# Patient Record
Sex: MALE | Race: WHITE | Employment: FULL TIME | URBAN - NONMETROPOLITAN AREA
[De-identification: names, ages, dates, MRNs, and addresses within clinical notes are randomized per-mention and may not be internally consistent; named-entity substitution may affect disease eponyms.]

---

## 2022-07-23 ENCOUNTER — APPOINTMENT (OUTPATIENT)
Dept: GENERAL RADIOLOGY | Age: 52
DRG: 291 | End: 2022-07-23
Payer: COMMERCIAL

## 2022-07-23 ENCOUNTER — HOSPITAL ENCOUNTER (INPATIENT)
Age: 52
LOS: 3 days | Discharge: HOME | DRG: 291 | End: 2022-07-26
Attending: INTERNAL MEDICINE | Admitting: INTERNAL MEDICINE
Payer: COMMERCIAL

## 2022-07-23 ENCOUNTER — APPOINTMENT (OUTPATIENT)
Dept: CT IMAGING | Age: 52
DRG: 291 | End: 2022-07-23
Payer: COMMERCIAL

## 2022-07-23 ENCOUNTER — APPOINTMENT (OUTPATIENT)
Dept: ULTRASOUND IMAGING | Age: 52
DRG: 291 | End: 2022-07-23
Payer: COMMERCIAL

## 2022-07-23 DIAGNOSIS — I50.31 ACUTE DIASTOLIC CHF (CONGESTIVE HEART FAILURE) (HCC): ICD-10-CM

## 2022-07-23 DIAGNOSIS — R60.1 ANASARCA: ICD-10-CM

## 2022-07-23 DIAGNOSIS — N50.89 SCROTAL SWELLING: ICD-10-CM

## 2022-07-23 DIAGNOSIS — I50.23 ACUTE ON CHRONIC SYSTOLIC CONGESTIVE HEART FAILURE (HCC): Primary | ICD-10-CM

## 2022-07-23 PROBLEM — J96.01 ACUTE RESPIRATORY FAILURE WITH HYPOXIA (HCC): Status: ACTIVE | Noted: 2022-07-23

## 2022-07-23 PROBLEM — E66.01 MORBID OBESITY (HCC): Status: ACTIVE | Noted: 2022-07-23

## 2022-07-23 PROBLEM — R73.9 HYPERGLYCEMIA, UNSPECIFIED: Status: ACTIVE | Noted: 2022-07-23

## 2022-07-23 PROBLEM — N43.3 BILATERAL HYDROCELE: Status: ACTIVE | Noted: 2022-07-23

## 2022-07-23 PROBLEM — E87.3 ACUTE RESPIRATORY ALKALOSIS: Status: ACTIVE | Noted: 2022-07-23

## 2022-07-23 PROBLEM — R09.89 PULMONARY VASCULAR CONGESTION: Status: ACTIVE | Noted: 2022-07-23

## 2022-07-23 LAB
ALBUMIN SERPL-MCNC: 4.4 G/DL (ref 3.5–5.1)
ALP BLD-CCNC: 84 U/L (ref 38–126)
ALT SERPL-CCNC: 19 U/L (ref 11–66)
ANION GAP SERPL CALCULATED.3IONS-SCNC: 13 MEQ/L (ref 8–16)
AST SERPL-CCNC: 34 U/L (ref 5–40)
AVERAGE GLUCOSE: 114 MG/DL (ref 70–126)
BASE EXCESS MIXED: 4.2 MMOL/L (ref -2–3)
BASOPHILS # BLD: 0.3 %
BASOPHILS ABSOLUTE: 0 THOU/MM3 (ref 0–0.1)
BILIRUB SERPL-MCNC: 0.4 MG/DL (ref 0.3–1.2)
BILIRUBIN URINE: NEGATIVE
BLOOD, URINE: NEGATIVE
BUN BLDV-MCNC: 7 MG/DL (ref 7–22)
CALCIUM SERPL-MCNC: 8.9 MG/DL (ref 8.5–10.5)
CHARACTER, URINE: CLEAR
CHLORIDE BLD-SCNC: 94 MEQ/L (ref 98–111)
CO2: 29 MEQ/L (ref 23–33)
COLLECTED BY:: ABNORMAL
COLOR: YELLOW
CREAT SERPL-MCNC: 0.9 MG/DL (ref 0.4–1.2)
D-DIMER QUANTITATIVE: 1104 NG/ML FEU (ref 0–500)
EOSINOPHIL # BLD: 2.1 %
EOSINOPHILS ABSOLUTE: 0.1 THOU/MM3 (ref 0–0.4)
ERYTHROCYTE [DISTWIDTH] IN BLOOD BY AUTOMATED COUNT: 15.2 % (ref 11.5–14.5)
ERYTHROCYTE [DISTWIDTH] IN BLOOD BY AUTOMATED COUNT: 49.7 FL (ref 35–45)
GFR SERPL CREATININE-BSD FRML MDRD: 89 ML/MIN/1.73M2
GLUCOSE BLD-MCNC: 149 MG/DL (ref 70–108)
GLUCOSE URINE: NEGATIVE MG/DL
HBA1C MFR BLD: 5.8 % (ref 4.4–6.4)
HCO3, MIXED: 28 MMOL/L (ref 23–28)
HCT VFR BLD CALC: 49 % (ref 42–52)
HEMOGLOBIN: 15.2 GM/DL (ref 14–18)
IMMATURE GRANS (ABS): 0.01 THOU/MM3 (ref 0–0.07)
IMMATURE GRANULOCYTES: 0.2 %
KETONES, URINE: NEGATIVE
LEUKOCYTE ESTERASE, URINE: NEGATIVE
LYMPHOCYTES # BLD: 9.6 %
LYMPHOCYTES ABSOLUTE: 0.6 THOU/MM3 (ref 1–4.8)
MAGNESIUM: 2.2 MG/DL (ref 1.6–2.4)
MCH RBC QN AUTO: 27.8 PG (ref 26–33)
MCHC RBC AUTO-ENTMCNC: 31 GM/DL (ref 32.2–35.5)
MCV RBC AUTO: 89.6 FL (ref 80–94)
MONOCYTES # BLD: 7.4 %
MONOCYTES ABSOLUTE: 0.4 THOU/MM3 (ref 0.4–1.3)
NITRITE, URINE: NEGATIVE
NUCLEATED RED BLOOD CELLS: 0 /100 WBC
O2 SAT, MIXED: 100 %
OSMOLALITY CALCULATION: 272.7 MOSMOL/KG (ref 275–300)
PCO2, MIXED VENOUS: 39 MMHG (ref 41–51)
PH UA: 6 (ref 5–9)
PH, MIXED: 7.47 (ref 7.31–7.41)
PHOSPHORUS: 4 MG/DL (ref 2.4–4.7)
PLATELET # BLD: 188 THOU/MM3 (ref 130–400)
PMV BLD AUTO: 8.7 FL (ref 9.4–12.4)
PO2 MIXED: 193 MMHG (ref 25–40)
POTASSIUM REFLEX MAGNESIUM: 4.4 MEQ/L (ref 3.5–5.2)
PRO-BNP: 237.8 PG/ML (ref 0–900)
PROTEIN UA: NEGATIVE
RBC # BLD: 5.47 MILL/MM3 (ref 4.7–6.1)
SEG NEUTROPHILS: 80.4 %
SEGMENTED NEUTROPHILS ABSOLUTE COUNT: 4.7 THOU/MM3 (ref 1.8–7.7)
SODIUM BLD-SCNC: 136 MEQ/L (ref 135–145)
SPECIFIC GRAVITY, URINE: 1 (ref 1–1.03)
TOTAL PROTEIN: 7.7 G/DL (ref 6.1–8)
TROPONIN T: < 0.01 NG/ML
TSH SERPL DL<=0.05 MIU/L-ACNC: 1.55 UIU/ML (ref 0.4–4.2)
UROBILINOGEN, URINE: 0.2 EU/DL (ref 0–1)
WBC # BLD: 5.8 THOU/MM3 (ref 4.8–10.8)

## 2022-07-23 PROCEDURE — 83735 ASSAY OF MAGNESIUM: CPT

## 2022-07-23 PROCEDURE — 2500000003 HC RX 250 WO HCPCS: Performed by: INTERNAL MEDICINE

## 2022-07-23 PROCEDURE — 99285 EMERGENCY DEPT VISIT HI MDM: CPT

## 2022-07-23 PROCEDURE — 99223 1ST HOSP IP/OBS HIGH 75: CPT | Performed by: INTERNAL MEDICINE

## 2022-07-23 PROCEDURE — 80053 COMPREHEN METABOLIC PANEL: CPT

## 2022-07-23 PROCEDURE — 85025 COMPLETE CBC W/AUTO DIFF WBC: CPT

## 2022-07-23 PROCEDURE — 84484 ASSAY OF TROPONIN QUANT: CPT

## 2022-07-23 PROCEDURE — 81003 URINALYSIS AUTO W/O SCOPE: CPT

## 2022-07-23 PROCEDURE — 96374 THER/PROPH/DIAG INJ IV PUSH: CPT

## 2022-07-23 PROCEDURE — 83880 ASSAY OF NATRIURETIC PEPTIDE: CPT

## 2022-07-23 PROCEDURE — 6370000000 HC RX 637 (ALT 250 FOR IP): Performed by: INTERNAL MEDICINE

## 2022-07-23 PROCEDURE — 6360000002 HC RX W HCPCS: Performed by: INTERNAL MEDICINE

## 2022-07-23 PROCEDURE — 93005 ELECTROCARDIOGRAM TRACING: CPT | Performed by: INTERNAL MEDICINE

## 2022-07-23 PROCEDURE — 82803 BLOOD GASES ANY COMBINATION: CPT

## 2022-07-23 PROCEDURE — 76870 US EXAM SCROTUM: CPT

## 2022-07-23 PROCEDURE — 85379 FIBRIN DEGRADATION QUANT: CPT

## 2022-07-23 PROCEDURE — 36415 COLL VENOUS BLD VENIPUNCTURE: CPT

## 2022-07-23 PROCEDURE — 1200000003 HC TELEMETRY R&B

## 2022-07-23 PROCEDURE — 84100 ASSAY OF PHOSPHORUS: CPT

## 2022-07-23 PROCEDURE — 83036 HEMOGLOBIN GLYCOSYLATED A1C: CPT

## 2022-07-23 PROCEDURE — 2580000003 HC RX 258: Performed by: INTERNAL MEDICINE

## 2022-07-23 PROCEDURE — 71045 X-RAY EXAM CHEST 1 VIEW: CPT

## 2022-07-23 PROCEDURE — 84443 ASSAY THYROID STIM HORMONE: CPT

## 2022-07-23 RX ORDER — ONDANSETRON 4 MG/1
4 TABLET, ORALLY DISINTEGRATING ORAL EVERY 8 HOURS PRN
Status: DISCONTINUED | OUTPATIENT
Start: 2022-07-23 | End: 2022-07-26 | Stop reason: HOSPADM

## 2022-07-23 RX ORDER — FUROSEMIDE 10 MG/ML
40 INJECTION INTRAMUSCULAR; INTRAVENOUS ONCE
Status: COMPLETED | OUTPATIENT
Start: 2022-07-23 | End: 2022-07-23

## 2022-07-23 RX ORDER — BUMETANIDE 0.25 MG/ML
1 INJECTION, SOLUTION INTRAMUSCULAR; INTRAVENOUS 2 TIMES DAILY
Status: DISCONTINUED | OUTPATIENT
Start: 2022-07-23 | End: 2022-07-24

## 2022-07-23 RX ORDER — SODIUM CHLORIDE 9 MG/ML
INJECTION, SOLUTION INTRAVENOUS PRN
Status: DISCONTINUED | OUTPATIENT
Start: 2022-07-23 | End: 2022-07-26 | Stop reason: HOSPADM

## 2022-07-23 RX ORDER — ENOXAPARIN SODIUM 100 MG/ML
30 INJECTION SUBCUTANEOUS 2 TIMES DAILY
Status: DISCONTINUED | OUTPATIENT
Start: 2022-07-24 | End: 2022-07-24

## 2022-07-23 RX ORDER — POLYETHYLENE GLYCOL 3350 17 G/17G
17 POWDER, FOR SOLUTION ORAL DAILY PRN
Status: DISCONTINUED | OUTPATIENT
Start: 2022-07-23 | End: 2022-07-26 | Stop reason: HOSPADM

## 2022-07-23 RX ORDER — LOSARTAN POTASSIUM 25 MG/1
25 TABLET ORAL DAILY
Status: DISCONTINUED | OUTPATIENT
Start: 2022-07-24 | End: 2022-07-26 | Stop reason: HOSPADM

## 2022-07-23 RX ORDER — ONDANSETRON 2 MG/ML
4 INJECTION INTRAMUSCULAR; INTRAVENOUS EVERY 6 HOURS PRN
Status: DISCONTINUED | OUTPATIENT
Start: 2022-07-23 | End: 2022-07-26 | Stop reason: HOSPADM

## 2022-07-23 RX ORDER — METOLAZONE 5 MG/1
5 TABLET ORAL DAILY
Status: COMPLETED | OUTPATIENT
Start: 2022-07-23 | End: 2022-07-24

## 2022-07-23 RX ORDER — POTASSIUM CHLORIDE 20 MEQ/1
20 TABLET, EXTENDED RELEASE ORAL 2 TIMES DAILY WITH MEALS
Status: DISCONTINUED | OUTPATIENT
Start: 2022-07-23 | End: 2022-07-26 | Stop reason: HOSPADM

## 2022-07-23 RX ORDER — ACETAMINOPHEN 325 MG/1
650 TABLET ORAL EVERY 6 HOURS PRN
Status: DISCONTINUED | OUTPATIENT
Start: 2022-07-23 | End: 2022-07-26 | Stop reason: HOSPADM

## 2022-07-23 RX ORDER — HYDRALAZINE HYDROCHLORIDE 20 MG/ML
10 INJECTION INTRAMUSCULAR; INTRAVENOUS EVERY 6 HOURS PRN
Status: DISCONTINUED | OUTPATIENT
Start: 2022-07-23 | End: 2022-07-26 | Stop reason: HOSPADM

## 2022-07-23 RX ORDER — SODIUM CHLORIDE 0.9 % (FLUSH) 0.9 %
10 SYRINGE (ML) INJECTION PRN
Status: DISCONTINUED | OUTPATIENT
Start: 2022-07-23 | End: 2022-07-26 | Stop reason: HOSPADM

## 2022-07-23 RX ORDER — SODIUM CHLORIDE 0.9 % (FLUSH) 0.9 %
10 SYRINGE (ML) INJECTION EVERY 12 HOURS SCHEDULED
Status: DISCONTINUED | OUTPATIENT
Start: 2022-07-23 | End: 2022-07-26 | Stop reason: HOSPADM

## 2022-07-23 RX ORDER — ACETAMINOPHEN 650 MG/1
650 SUPPOSITORY RECTAL EVERY 6 HOURS PRN
Status: DISCONTINUED | OUTPATIENT
Start: 2022-07-23 | End: 2022-07-26 | Stop reason: HOSPADM

## 2022-07-23 RX ADMIN — BUMETANIDE 1 MG: 0.25 INJECTION INTRAMUSCULAR; INTRAVENOUS at 23:25

## 2022-07-23 RX ADMIN — SODIUM CHLORIDE, PRESERVATIVE FREE 10 ML: 5 INJECTION INTRAVENOUS at 23:06

## 2022-07-23 RX ADMIN — POTASSIUM CHLORIDE 20 MEQ: 1500 TABLET, EXTENDED RELEASE ORAL at 23:26

## 2022-07-23 RX ADMIN — FUROSEMIDE 40 MG: 10 INJECTION, SOLUTION INTRAMUSCULAR; INTRAVENOUS at 16:21

## 2022-07-23 ASSESSMENT — ENCOUNTER SYMPTOMS
GASTROINTESTINAL NEGATIVE: 1
EYES NEGATIVE: 1
ALLERGIC/IMMUNOLOGIC NEGATIVE: 1
SHORTNESS OF BREATH: 1

## 2022-07-23 ASSESSMENT — PAIN - FUNCTIONAL ASSESSMENT
PAIN_FUNCTIONAL_ASSESSMENT: NONE - DENIES PAIN
PAIN_FUNCTIONAL_ASSESSMENT: WONG-BAKER FACES

## 2022-07-23 ASSESSMENT — LIFESTYLE VARIABLES
HOW OFTEN DO YOU HAVE A DRINK CONTAINING ALCOHOL: 4 OR MORE TIMES A WEEK
HOW MANY STANDARD DRINKS CONTAINING ALCOHOL DO YOU HAVE ON A TYPICAL DAY: 7 TO 9

## 2022-07-23 ASSESSMENT — PAIN SCALES - GENERAL: PAINLEVEL_OUTOF10: 5

## 2022-07-23 ASSESSMENT — PAIN SCALES - WONG BAKER: WONGBAKER_NUMERICALRESPONSE: 2

## 2022-07-23 ASSESSMENT — PAIN DESCRIPTION - LOCATION: LOCATION: GENERALIZED

## 2022-07-23 NOTE — ED PROVIDER NOTES
eMERGENCY dEPARTMENT eNCOUnter      [address]    Chief Complaint   Patient presents with    Testicle Swelling    Shortness of Breath       HPI    Ashvin Maradiaga is a 46 y.o. male who presents the emergency department because of scrotal swelling. Patient has the swelling for last 5 days. Patient is not taking his blood pressure medications and a diuretic for about a month now. Patient has been gaining weight since then. Patient does not have any chest pain or chest pressure but has been complaining of shortness of breath, dyspnea on exertion and orthopnea. Patient also has swelling in his lower extremity. Patient does not have any cough fever or chills there is no dysuria or frequency. There is no testicular pain    PAST MEDICAL HISTORY    History reviewed. No pertinent past medical history. SURGICAL HISTORY    History reviewed. No pertinent surgical history. CURRENT MEDICATIONS        ALLERGIES    Not on File    FAMILY HISTORY    History reviewed. No pertinent family history. SOCIAL HISTORY    Social History     Socioeconomic History    Marital status: Single     Spouse name: None    Number of children: None    Years of education: None    Highest education level: None       REVIEW OF SYSTEMS    Constitutional:  Denies fever, chills, weight loss or weakness   Eyes:  Denies photophobia or discharge   HENT:  Denies sore throat or ear pain   Respiratory:  Denies cough or shortness of breath   Cardiovascular:  Denies chest pain, palpitations or swelling   GI:  Denies abdominal pain, nausea, vomiting, or diarrhea   Musculoskeletal:  Denies back pain   Skin:  Denies rash   Neurologic:  Denies headache, focal weakness or sensory changes   Endocrine:  Denies polyuria or polydypsia   Lymphatic:  Denies swollen glands   Psychiatric:  Denies depression, suicidal ideation or homicidal ideation   All systems negative except as marked.      PHYSICAL EXAM    VITAL SIGNS: BP (!) 184/112   Pulse (!) 111   Temp 98.5 °F (36.9 °C) (Oral)   Resp 24   Wt (!) 320 lb (145.2 kg)   SpO2 95%    Constitutional:  Well developed, Well nourished, No acute distress, Non-toxic appearance. HENT:  Normocephalic, Atraumatic, Bilateral external ears normal, Oropharynx moist, No oral exudates, Nose normal. Neck- Normal range of motion, No tenderness, Supple, No stridor. Eyes:  PERRL, EOMI, Conjunctiva normal, No discharge. Respiratory:  Normal breath sounds, No respiratory distress, No wheezing, No chest tenderness. Cardiovascular:  Normal heart rate, Normal rhythm, No murmurs, No rubs, No gallops. GI:  Bowel sounds normal, Soft, No tenderness, No masses, No pulsatile masses. : Scrotal swelling which is gross, No masses or lesions. No discharge. No CVA tenderness. Musculoskeletal:  Intact distal pulses, No edema, No tenderness, No cyanosis, No clubbing. Good range of motion in all major joints. No tenderness to palpation or major deformities noted. Back- No tenderness. Integument:  Warm, Dry, No erythema, No rash. Lymphatic:  No lymphadenopathy noted. Neurologic:  Alert & oriented x 3, Normal motor function, Normal sensory function, No focal deficits noted. Psychiatric:  Affect normal, Judgment normal, Mood normal.     EKG    Sinus tachycardia  Left axis deviation  Pulmonary disease pattern  Right bundle lv block  Ventricular rate 114 bpm  AL interval 158 ms  QRS duration 112 ms  QTc interval 471 ms    RADIOLOGY    US SCROTUM AND TESTICLES   Final Result   1. Normal testicular ultrasound. 2. Bilateral hydroceles. 3. Scrotal swelling, likely secondary to cellulitis. Clinical correlation is required. 4. Normal Doppler study. Final report electronically signed by Dr. Luis Fernando Shaw on 7/23/2022 6:03 PM      XR CHEST PORTABLE   Final Result   1. Moderate cardiomegaly with pulmonary vascular congestion suspicious for congestive heart failure.    2. Prominent pulmonary interstitium suspicious for pulmonary edema. **This report has been created using voice recognition software. It may contain minor errors which are inherent in voice recognition technology. **      Final report electronically signed by Dr. Shaheed Hall on 7/23/2022 3:43 PM           LABS  Labs Reviewed   CBC WITH AUTO DIFFERENTIAL - Abnormal; Notable for the following components:       Result Value    MCHC 31.0 (*)     RDW-CV 15.2 (*)     RDW-SD 49.7 (*)     MPV 8.7 (*)     Lymphocytes Absolute 0.6 (*)     All other components within normal limits   COMPREHENSIVE METABOLIC PANEL W/ REFLEX TO MG FOR LOW K - Abnormal; Notable for the following components:    Glucose 149 (*)     Chloride 94 (*)     All other components within normal limits   GLOMERULAR FILTRATION RATE, ESTIMATED - Abnormal; Notable for the following components:    Est, Glom Filt Rate 89 (*)     All other components within normal limits   OSMOLALITY - Abnormal; Notable for the following components:    Osmolality Calc 272.7 (*)     All other components within normal limits   TROPONIN   BRAIN NATRIURETIC PEPTIDE   URINALYSIS WITH REFLEX TO CULTURE   ANION GAP   D-DIMER, QUANTITATIVE   MAGNESIUM   PHOSPHORUS   TSH WITH REFLEX   HEMOGLOBIN A1C   BLOOD GAS, VENOUS          CONSULTS  Hospitalist  ED COURSE & MEDICAL DECISION MAKING    Pertinent Labs & Imaging studies reviewed. (See chart for details)  Patient's ultrasound showed scrotal swelling without any testicular pathology. Patient chest x-ray was consistent with congestive heart failure versus pulmonary edema. Patient was started on IV diuretics which improved patient's condition. Patient white count is 5800 hemoglobin 15.2. Urine is negative. Patient will be admitted by hospitalist for further work-up and management. FINAL IMPRESSION    1. Acute on chronic systolic congestive heart failure ([address]    2. Anasarca    3.  Scrotal swelling             Gia Montemayor MD  07/23/22 2003

## 2022-07-23 NOTE — H&P
Patient: Saurabh Duenas    Unit/Bed: 6K-02/002-A    YOB: 1970    MRN: [id number]    Acct: [de-identified]     Admitting Diagnosis: Anasarca [R60.1]  Scrotal swelling [N50.89]  Acute on chronic systolic congestive heart failure (HCC) [A93.69]  Acute diastolic CHF (congestive heart failure) ([facility] [I50.31]    Admit Date:  7/23/2022    CC: Bilateral lower extremity swelling, scrotal swelling and shortness of breath x5 days. HPI :  70-year-old male patient who is morbidly obese. Presented to the ED today with a 5-day history of increased bilateral lower extremity swelling, scrotal   swelling without pain, shortness of breath at rest and on exertion . However , his symptoms have been going on for more than 2 weeks because he ran out of diuretics. He denies any chest pain, chest pressure, palpitations, nausea vomiting, and no diarrhea. Initial vital signs in the ED temperature 36.9 °C, respiratory 30, heart rate 116 bpm, blood pressure 180/110, oxygen saturation 89% on room air. Body weight is 145.2 kg. Initial labs in the ED serum sodium 136, potassium 4.4, chloride 94, CO2 29, BUN 7, creatinine 0.9, anion gap 13.0, blood sugar 149, calcium 8.9, serum osmolality 272.7, total protein 7.7, proBNP 237.8, troponin less than 0.010, albumin 4.4, alkaline phosphatase 84, ALT 19, AST 34, T bili 0.4. WBC 5.8, hemoglobin 15.2, MCV 89.6, platelet 572. Twelve-lead EKG which I reviewed shows sinus tachycardia at 114 bpm.  Right bundle branch block. QTC of 471 ms. No ST segment elevation or depression. Chest x-ray which I reviewed shows mild pulmonary congestion and haziness. No notable infiltrate. Moderate cardiomegaly. Review of Systems   Constitutional:  Positive for activity change. HENT: Negative. Eyes: Negative. Respiratory:  Positive for shortness of breath. Cardiovascular:  Positive for leg swelling. Gastrointestinal: Negative. Endocrine: Negative. Genitourinary:  Positive for scrotal swelling. Musculoskeletal: Negative. Skin: Negative. Allergic/Immunologic: Negative. Neurological: Negative. Hematological: Negative. Psychiatric/Behavioral: Negative. All other systems reviewed and are negative. Past Medical History:        Diagnosis Date    CHF (congestive heart failure) ([facility]     Hypertension        Past Surgical History:    History reviewed. No pertinent surgical history. Medications Prior to Admission:    Prior to Admission medications    Not on File       Allergies:    Patient has no allergy information on record. Social History:    TOBACCO:   has no history on file for tobacco use. ETOH:   has no history on file for alcohol use. Family History:    History reviewed. No pertinent family history. Objective:   BP (!) 163/104   Pulse (!) 110   Temp 98.1 °F (36.7 °C) (Oral)   Resp 24   Ht 5' 8\" (1.727 m)   Wt (!) 322 lb (146.1 kg)   SpO2 97%   BMI 48.96 kg/m²     Intake/Output Summary (Last 24 hours) at 7/24/2022 0231  Last data filed at 7/24/2022 0100  Gross per 24 hour   Intake --   Output 1350 ml   Net -1350 ml       Physical Exam  Vitals and nursing note reviewed. Constitutional:       General: He is not in acute distress. Appearance: Normal appearance. He is obese. He is not ill-appearing, toxic-appearing or diaphoretic. HENT:      Head: Normocephalic and atraumatic. Right Ear: External ear normal.      Left Ear: External ear normal.      Nose: Nose normal. No congestion or rhinorrhea. Mouth/Throat:      Mouth: Mucous membranes are moist.      Pharynx: Oropharynx is clear. No oropharyngeal exudate or posterior oropharyngeal erythema. Eyes:      General: No scleral icterus. Right eye: No discharge. Left eye: No discharge. Extraocular Movements: Extraocular movements intact.       Conjunctiva/sclera: Conjunctivae normal.      Pupils: Pupils are equal, round, and reactive to light.   Neck:      Vascular: No carotid bruit. Cardiovascular:      Rate and Rhythm: Normal rate and regular rhythm. Pulses: Normal pulses. Heart sounds: Normal heart sounds. No murmur heard. No friction rub. No gallop. Pulmonary:      Effort: Pulmonary effort is normal. No respiratory distress. Breath sounds: Normal breath sounds. No stridor. No wheezing, rhonchi or rales. Chest:      Chest wall: No tenderness. Abdominal:      General: Bowel sounds are normal. There is no distension. Palpations: Abdomen is soft. There is no mass. Tenderness: There is abdominal tenderness. There is no right CVA tenderness, left CVA tenderness, guarding or rebound. Hernia: No hernia is present. Comments: Nontender distended abdomen. Genitourinary:     Comments: Nontender scrotal swelling with edema  Musculoskeletal:         General: No swelling, tenderness, deformity or signs of injury. Normal range of motion. Cervical back: Normal range of motion and neck supple. No rigidity or tenderness. Right lower leg: Edema present. Left lower leg: Edema present. Lymphadenopathy:      Cervical: No cervical adenopathy. Skin:     General: Skin is warm. Coloration: Skin is not jaundiced or pale. Findings: No bruising, erythema, lesion or rash. Neurological:      General: No focal deficit present. Mental Status: He is alert and oriented to person, place, and time. Mental status is at baseline. Cranial Nerves: No cranial nerve deficit. Sensory: No sensory deficit. Motor: No weakness. Coordination: Coordination normal.      Gait: Gait normal.      Deep Tendon Reflexes: Reflexes normal.   Psychiatric:         Mood and Affect: Mood normal.         Behavior: Behavior normal.         Thought Content:  Thought content normal.         Judgment: Judgment normal.   :  Medications:   Continuous Infusions:    PRN Meds:    Data:    CBC:   Recent Labs 07/23/22  1520   WBC 5.8   RBC 5.47   HGB 15.2   HCT 49.0   MCV 89.6          BMP:   Recent Labs     07/23/22  1520 07/23/22 2006     --    K 4.4  --    CL 94*  --    CO2 29  --    PHOS  --  4.0   BUN 7  --    CREATININE 0.9  --        PT/INR: No results for input(s): PROTIME, INR in the last 72 hours. LIVER PROFILE:   Recent Labs     07/23/22  1520   AST 34   ALT 19   BILITOT 0.4   ALKPHOS 84       Latest Reference Range & Units 7/23/22 20:06   Magnesium 1.6 - 2.4 mg/dL 2.2   Phosphorus 2.4 - 4.7 mg/dL 4.0   AVERAGE GLUCOSE 70 - 126 mg/dL 114   Hemoglobin A1C 4.4 - 6.4 % 5.8   TSH 0.400 - 4.200 uIU/mL 1.550      Latest Reference Range & Units 7/23/22 20:06   D-Dimer, Quant 0.00 - 500.00 ng/ml FEU 1104.00 (H)     VBG. Latest Reference Range & Units 7/23/22 20:14   PCO2, MIXED VENOUS 41 - 51 mmhg 39 (L)   PO2, Mixed 25 - 40 mmhg 193 (H)   HCO3, Mixed 23 - 28 mmol/l 28   Base Exc, Mixed -2.0 - 3.0 mmol/l 4.2 (H)   O2 Sat, Mixed % 100   PH MIXED 7.31 - 7.41  7.47 (H)   COLLECTED BY:  [id number]       URINALYSIS. Latest Reference Range & Units 7/23/22 19:20   Color, UA STRAW-YELLOW  YELLOW   Glucose, UA NEGATIVE mg/dl NEGATIVE   Bilirubin, Urine NEGATIVE  NEGATIVE   Ketones, Urine NEGATIVE  NEGATIVE   Blood, Urine NEGATIVE  NEGATIVE   pH, UA 5.0 - 9.0  6.0   Protein, UA NEGATIVE  NEGATIVE   Urobilinogen, Urine 0.0 - 1.0 eu/dl 0.2   Nitrite, Urine NEGATIVE  NEGATIVE   Leukocyte Esterase, Urine NEGATIVE  NEGATIVE   Character, Urine CLEAR-SL CLOUD  CLEAR   URINALYSIS WITH REFLEX TO CULTURE  Rpt   Specific Gravity, Urine 1.002 - 1.030  1.005     SEROLOGY  None. TUMOR MARKERS. None. MICROBIOLOGY   None. HISTOPATHOLOGY. None. TOXICOLOGY. None. ENDOSCOPE STUDIES. None. SURGICAL PROCEDURES. None. CARDIAC PROCEDURES. None. IR PROCEDURES. None. RADIOLOGY. CTA chest -7/24/2022. FINDINGS:  The thyroid is unremarkable. Normal caliber of the thoracic aorta.      Borderline bilaterally. Doppler study:   There is color Doppler flow in both testicles and in the extratesticular structures. Flow is symmetric bilaterally. There is no Doppler evidence of torsion. IMPRESSION:  1. Normal testicular ultrasound. 2. Bilateral hydroceles. 3. Scrotal swelling, likely secondary to cellulitis. Clinical correlation is required. 4. Normal Doppler study. ASSESSMENT AND  PLAN. 1.PULMONARY. Acute respiratory alkalosis w/ Ph 7.47. Acute hypoxic respiratory failure due to vascular congestion - O2 supplement, wean as tolerated. REKHA / OHS - CPAP at bedtime . CTA negative for PE.     RVP check. 2.CARDIOVASCULAR. Acute CHF exacerbation w/ suspected diastolic dysfunction - IV Bumex and Zaroxolyn. Uncontrolled hypertension-IV hydralazine as needed, losartan 25 and Coreg. Echo for LV and WMA evaluation. 3.GI. Hepatomegaly with hepatic steatosis. 4.ID. UA negative for UTI. 5.ENDO. Hyperglycemia -sliding scale insulin. TSH 1.55 and A1c 5.8 .    6.UROLOGY. Bilateral hydroceles. 7.NUTRITION. Morbid obesity with BMI needs regular exercise diet control for weight loss management. 8.DISPO. Planned d/c to home soon.       Electronically signed by Ruby Hassan MD on 7/24/2022 at 2:31 AM

## 2022-07-23 NOTE — ED NOTES
Upon first contact with patient this RN receives bedside shift report from [facility]. Pt is stable resting in cot. Urine specimen collected at this time. Pt updated on POC, verbalized understanding. Call light in reach.         Ac Cox RN  07/23/22 0625

## 2022-07-23 NOTE — ED NOTES
Pt reassessed. Resting on cot in stable condition. Denies pain.  Call light in reach     Delmi Kay RN  07/23/22 2272

## 2022-07-24 ENCOUNTER — APPOINTMENT (OUTPATIENT)
Dept: CT IMAGING | Age: 52
DRG: 291 | End: 2022-07-24
Payer: COMMERCIAL

## 2022-07-24 ENCOUNTER — APPOINTMENT (OUTPATIENT)
Dept: INTERVENTIONAL RADIOLOGY/VASCULAR | Age: 52
DRG: 291 | End: 2022-07-24
Payer: COMMERCIAL

## 2022-07-24 PROBLEM — N43.2 OTHER HYDROCELE: Status: ACTIVE | Noted: 2022-07-23

## 2022-07-24 PROBLEM — N50.89 SCROTAL SWELLING: Status: ACTIVE | Noted: 2022-07-24

## 2022-07-24 PROBLEM — G47.33 OBSTRUCTIVE SLEEP APNEA: Status: ACTIVE | Noted: 2022-07-24

## 2022-07-24 PROBLEM — I87.2 CHRONIC VENOUS STASIS DERMATITIS: Status: ACTIVE | Noted: 2022-07-24

## 2022-07-24 PROBLEM — R16.0 HEPATOMEGALY: Status: ACTIVE | Noted: 2022-07-24

## 2022-07-24 PROBLEM — J81.0 ACUTE PULMONARY EDEMA (HCC): Status: ACTIVE | Noted: 2022-07-24

## 2022-07-24 PROBLEM — R60.1 ANASARCA: Status: ACTIVE | Noted: 2022-07-24

## 2022-07-24 LAB
ALBUMIN SERPL-MCNC: 3.8 G/DL (ref 3.5–5.1)
ALP BLD-CCNC: 78 U/L (ref 38–126)
ALT SERPL-CCNC: 16 U/L (ref 11–66)
APTT: 31.9 SECONDS (ref 22–38)
AST SERPL-CCNC: 26 U/L (ref 5–40)
BILIRUB SERPL-MCNC: 0.8 MG/DL (ref 0.3–1.2)
BILIRUBIN DIRECT: < 0.2 MG/DL (ref 0–0.3)
EKG ATRIAL RATE: 114 BPM
EKG P AXIS: 54 DEGREES
EKG P-R INTERVAL: 158 MS
EKG Q-T INTERVAL: 342 MS
EKG QRS DURATION: 112 MS
EKG QTC CALCULATION (BAZETT): 471 MS
EKG R AXIS: -86 DEGREES
EKG T AXIS: 60 DEGREES
EKG VENTRICULAR RATE: 114 BPM
GLUCOSE BLD-MCNC: 105 MG/DL (ref 70–108)
GLUCOSE BLD-MCNC: 108 MG/DL (ref 70–108)
GLUCOSE BLD-MCNC: 91 MG/DL (ref 70–108)
HEPARIN UNFRACTIONATED: 0.47 U/ML (ref 0.3–0.7)
INR BLD: 0.98 (ref 0.85–1.13)
INR BLD: 0.99 (ref 0.85–1.13)
LACTIC ACID: 1.1 MMOL/L (ref 0.5–2)
POTASSIUM SERPL-SCNC: 4.3 MEQ/L (ref 3.5–5.2)
TOTAL PROTEIN: 7.1 G/DL (ref 6.1–8)

## 2022-07-24 PROCEDURE — 71275 CT ANGIOGRAPHY CHEST: CPT

## 2022-07-24 PROCEDURE — 6370000000 HC RX 637 (ALT 250 FOR IP): Performed by: INTERNAL MEDICINE

## 2022-07-24 PROCEDURE — 82948 REAGENT STRIP/BLOOD GLUCOSE: CPT

## 2022-07-24 PROCEDURE — 93010 ELECTROCARDIOGRAM REPORT: CPT | Performed by: INTERNAL MEDICINE

## 2022-07-24 PROCEDURE — 85610 PROTHROMBIN TIME: CPT

## 2022-07-24 PROCEDURE — 83605 ASSAY OF LACTIC ACID: CPT

## 2022-07-24 PROCEDURE — 80076 HEPATIC FUNCTION PANEL: CPT

## 2022-07-24 PROCEDURE — 85520 HEPARIN ASSAY: CPT

## 2022-07-24 PROCEDURE — 99233 SBSQ HOSP IP/OBS HIGH 50: CPT | Performed by: STUDENT IN AN ORGANIZED HEALTH CARE EDUCATION/TRAINING PROGRAM

## 2022-07-24 PROCEDURE — 6360000002 HC RX W HCPCS: Performed by: INTERNAL MEDICINE

## 2022-07-24 PROCEDURE — 99221 1ST HOSP IP/OBS SF/LOW 40: CPT | Performed by: UROLOGY

## 2022-07-24 PROCEDURE — 6360000004 HC RX CONTRAST MEDICATION: Performed by: INTERNAL MEDICINE

## 2022-07-24 PROCEDURE — 6360000002 HC RX W HCPCS: Performed by: STUDENT IN AN ORGANIZED HEALTH CARE EDUCATION/TRAINING PROGRAM

## 2022-07-24 PROCEDURE — 36415 COLL VENOUS BLD VENIPUNCTURE: CPT

## 2022-07-24 PROCEDURE — 2580000003 HC RX 258: Performed by: INTERNAL MEDICINE

## 2022-07-24 PROCEDURE — 84132 ASSAY OF SERUM POTASSIUM: CPT

## 2022-07-24 PROCEDURE — 1200000003 HC TELEMETRY R&B

## 2022-07-24 PROCEDURE — 93970 EXTREMITY STUDY: CPT

## 2022-07-24 PROCEDURE — 2500000003 HC RX 250 WO HCPCS: Performed by: STUDENT IN AN ORGANIZED HEALTH CARE EDUCATION/TRAINING PROGRAM

## 2022-07-24 PROCEDURE — 85730 THROMBOPLASTIN TIME PARTIAL: CPT

## 2022-07-24 RX ORDER — HEPARIN SODIUM 1000 [USP'U]/ML
68 INJECTION, SOLUTION INTRAVENOUS; SUBCUTANEOUS ONCE
Status: COMPLETED | OUTPATIENT
Start: 2022-07-24 | End: 2022-07-24

## 2022-07-24 RX ORDER — HEPARIN SODIUM 10000 [USP'U]/100ML
5-30 INJECTION, SOLUTION INTRAVENOUS CONTINUOUS
Status: DISCONTINUED | OUTPATIENT
Start: 2022-07-24 | End: 2022-07-25

## 2022-07-24 RX ORDER — LANOLIN ALCOHOL/MO/W.PET/CERES
100 CREAM (GRAM) TOPICAL DAILY
Status: DISCONTINUED | OUTPATIENT
Start: 2022-07-24 | End: 2022-07-26 | Stop reason: HOSPADM

## 2022-07-24 RX ORDER — AMLODIPINE BESYLATE 10 MG/1
10 TABLET ORAL DAILY
Status: ON HOLD | COMMUNITY
Start: 2022-06-22 | End: 2022-07-26 | Stop reason: HOSPADM

## 2022-07-24 RX ORDER — HEPARIN SODIUM 1000 [USP'U]/ML
80 INJECTION, SOLUTION INTRAVENOUS; SUBCUTANEOUS PRN
Status: DISCONTINUED | OUTPATIENT
Start: 2022-07-24 | End: 2022-07-24

## 2022-07-24 RX ORDER — BUMETANIDE 1 MG/1
2 TABLET ORAL ONCE
Status: DISCONTINUED | OUTPATIENT
Start: 2022-07-24 | End: 2022-07-24

## 2022-07-24 RX ORDER — HEPARIN SODIUM 10000 [USP'U]/100ML
5-30 INJECTION, SOLUTION INTRAVENOUS CONTINUOUS
Status: DISCONTINUED | OUTPATIENT
Start: 2022-07-24 | End: 2022-07-24

## 2022-07-24 RX ORDER — HEPARIN SODIUM 1000 [USP'U]/ML
68 INJECTION, SOLUTION INTRAVENOUS; SUBCUTANEOUS PRN
Status: DISCONTINUED | OUTPATIENT
Start: 2022-07-24 | End: 2022-07-25

## 2022-07-24 RX ORDER — FOLIC ACID 1 MG/1
1 TABLET ORAL DAILY
Status: DISCONTINUED | OUTPATIENT
Start: 2022-07-24 | End: 2022-07-26 | Stop reason: HOSPADM

## 2022-07-24 RX ORDER — BUMETANIDE 0.25 MG/ML
2 INJECTION, SOLUTION INTRAMUSCULAR; INTRAVENOUS 2 TIMES DAILY
Status: COMPLETED | OUTPATIENT
Start: 2022-07-24 | End: 2022-07-25

## 2022-07-24 RX ORDER — HEPARIN SODIUM 1000 [USP'U]/ML
34 INJECTION, SOLUTION INTRAVENOUS; SUBCUTANEOUS PRN
Status: DISCONTINUED | OUTPATIENT
Start: 2022-07-24 | End: 2022-07-25

## 2022-07-24 RX ORDER — HEPARIN SODIUM 1000 [USP'U]/ML
80 INJECTION, SOLUTION INTRAVENOUS; SUBCUTANEOUS ONCE
Status: DISCONTINUED | OUTPATIENT
Start: 2022-07-24 | End: 2022-07-24

## 2022-07-24 RX ORDER — CARVEDILOL 6.25 MG/1
12.5 TABLET ORAL 2 TIMES DAILY
Status: DISCONTINUED | OUTPATIENT
Start: 2022-07-24 | End: 2022-07-26 | Stop reason: HOSPADM

## 2022-07-24 RX ORDER — BUMETANIDE 0.25 MG/ML
2 INJECTION, SOLUTION INTRAMUSCULAR; INTRAVENOUS 2 TIMES DAILY
Status: DISCONTINUED | OUTPATIENT
Start: 2022-07-24 | End: 2022-07-24

## 2022-07-24 RX ORDER — BUMETANIDE 1 MG/1
2 TABLET ORAL DAILY
Status: DISCONTINUED | OUTPATIENT
Start: 2022-07-24 | End: 2022-07-24

## 2022-07-24 RX ORDER — HEPARIN SODIUM 1000 [USP'U]/ML
40 INJECTION, SOLUTION INTRAVENOUS; SUBCUTANEOUS PRN
Status: DISCONTINUED | OUTPATIENT
Start: 2022-07-24 | End: 2022-07-24

## 2022-07-24 RX ADMIN — CARVEDILOL 12.5 MG: 6.25 TABLET, FILM COATED ORAL at 18:33

## 2022-07-24 RX ADMIN — SODIUM CHLORIDE, PRESERVATIVE FREE 10 ML: 5 INJECTION INTRAVENOUS at 20:04

## 2022-07-24 RX ADMIN — IOPAMIDOL 85 ML: 755 INJECTION, SOLUTION INTRAVENOUS at 00:59

## 2022-07-24 RX ADMIN — HEPARIN SODIUM AND DEXTROSE 14 UNITS/KG/HR: 10000; 5 INJECTION INTRAVENOUS at 18:50

## 2022-07-24 RX ADMIN — HYDRALAZINE HYDROCHLORIDE 10 MG: 20 INJECTION INTRAMUSCULAR; INTRAVENOUS at 00:28

## 2022-07-24 RX ADMIN — METOLAZONE 5 MG: 5 TABLET ORAL at 12:19

## 2022-07-24 RX ADMIN — POTASSIUM CHLORIDE 20 MEQ: 1500 TABLET, EXTENDED RELEASE ORAL at 18:33

## 2022-07-24 RX ADMIN — BUMETANIDE 2 MG: 0.25 INJECTION INTRAMUSCULAR; INTRAVENOUS at 20:04

## 2022-07-24 RX ADMIN — CARVEDILOL 12.5 MG: 6.25 TABLET, FILM COATED ORAL at 03:21

## 2022-07-24 RX ADMIN — LOSARTAN POTASSIUM 25 MG: 25 TABLET, FILM COATED ORAL at 01:15

## 2022-07-24 RX ADMIN — HEPARIN SODIUM 9930 UNITS: 1000 INJECTION, SOLUTION INTRAVENOUS; SUBCUTANEOUS at 18:33

## 2022-07-24 RX ADMIN — POTASSIUM CHLORIDE 20 MEQ: 1500 TABLET, EXTENDED RELEASE ORAL at 12:21

## 2022-07-24 RX ADMIN — SODIUM CHLORIDE, PRESERVATIVE FREE 10 ML: 5 INJECTION INTRAVENOUS at 12:19

## 2022-07-24 RX ADMIN — METOLAZONE 5 MG: 5 TABLET ORAL at 00:28

## 2022-07-24 ASSESSMENT — PAIN SCALES - GENERAL: PAINLEVEL_OUTOF10: 0

## 2022-07-24 NOTE — PROGRESS NOTES
ALLERGY: \"water pill made me swell up\", pt states. Cannot remember the name. Pt states was prescribed by Dr. Alf Stokes, office is closed now with no answering service. Pt states was dispensed by [facility] in [facility] a year ago\". Closed now until 0900. Will call [phone] when open for information. [facility] only has 2 past prescriptions for \"water pills\" Furosemide in 2015 and hydrochlorothiazide in 2017. Patient \"thinks\" the HCTZ was the med he started \"swelling up\" on, and said the Dr thought he was allergic to it and Dc'd it. Will need to confirm with Dr Lucero Flowers office on Monday.

## 2022-07-24 NOTE — PROGRESS NOTES
Pt refusing shipman catheter. Educated pt on strict I's & O's when attempting to diurese to assure appropriate care. Pt states he can use a urinal or \"hat\" to allow nursing to measure output.

## 2022-07-24 NOTE — ED NOTES
ED to inpatient nurses report    Chief Complaint   Patient presents with    Testicle Swelling    Shortness of Breath      Present to ED from home  LOC: alert and orientated to name, place, date  Vital signs   Vitals:    07/23/22 1620 07/23/22 1733 07/23/22 1902 07/23/22 1931   BP: (!) 170/90   (!) 184/112   Pulse: (!) 115 (!) 109 (!) 112 (!) 111   Resp: (!) 32 30 25 24   Temp:       TempSrc:       SpO2: 95% 96% 94% 95%   Weight:          Oxygen Baseline room air    Current needs required 3L NC     Peripheral IV 07/23/22 Right Antecubital (Active)   Site Assessment Clean, dry & intact 07/23/22 1932   Line Status Normal saline locked; Flushed 07/23/22 1733   Phlebitis Assessment No symptoms 07/23/22 1932   Infiltration Assessment 0 07/23/22 1932   Dressing Status Clean, dry & intact 07/23/22 1733   Dressing Intervention New 07/23/22 1733     Mobility: Requires assistance * 1  Pending ED orders: none  Present condition: stable    Electronically signed by Ami Brown RN on 7/23/2022 at 8:38 PM     Ami Brown RN  07/23/22 2039

## 2022-07-24 NOTE — PROGRESS NOTES
Hospitalist Progress Note    Patient:  Jesusita Morel    YOB: 1970  Unit/Bed:6K-25/025-A  MRN: [id number]    Acct: [de-identified]   PCP: Elif Fuentes II, DO    Date of Admission: 7/23/2022      Assessment/Plan:  Acute HFpEF likely secondary to diastolic dysfunction caused by uncontrolled hypertension. Continue diuretics received a dose of Lasix 40 mg, Bumex 1 mg and metolazone 5 mg twice daily. Continue Bumex 2 mg twice daily. Strict input and output. Daily weight. On oxygen maintain oxygen saturation 88 to 92%. Wean off oxygen as tolerated. Awaiting echocardiogram to look for structure and function of myocardium. Fluid restriction. Monitor electrolytes especially hypokalemia replace accordingly. Acute pulmonary edema. BiPAP. Use oxygen in between. Acute hypoxic respiratory failure secondary to cardiopulmonary decompensation. Secondary to above. Essential hypertension. As needed hydralazine. Started on Coreg 12.5 and losartan 25 mg. Obstructive sleep apnea-obesity hypoventilation syndrome BiPAP at night. Hydrocele causing scrotal swelling. Ultrasound reveals. Continue diuretics, anticipate improvement. We will get urology opinion. Alcohol abuse disorder. Thiamine and folate. Watch for withdrawal.  Social consult. Chronic venous dermatitis. Likely secondary to longstanding untreated heart failure. Continue diuretics. Compression socks at discharge. Bilateral indeterminant tibial DVT, left peroneal vein and determine DVT. Given the elevated D-dimers with moderate to high score on Wells criteria patient will need treatment. We will start heparin and eliquis at discharge. Patient will need 3 months of therapy. Patient will need age related screening. Follow-up will be needed with PCP on the length of anticoagulation. Risk and benefits were explained and discussed with the patient including but not limited to life-threatening GI and brain bleed.  No absolute contraindications. Morbid obesity. Expected discharge date:  7/26/2022    CTA showed Small subcentimeter nodes scattered throughout the mediastinum. Results forwarded to PCP. Disposition:   [x] Home  [] TCU  [] Rehab  [] Psych  [] SNF  [] [place]  [] Other-    ===================================================================      Chief Complaint: Scrotal swelling, shortness of breath    Hospital Course: 71-year-old gentleman with past medical history of hypertension, obstructive sleep apnea on CPAP compliant and alcohol abuse disorder presented to the hospital complaining of scrotal swelling and shortness of breath. As per patient he has been feeling short of breath for last 1 month and he was supposed to get diuretics but his primary care physician did not send the prescription. Patient also complaining of leg swelling, increasing abdominal girth, and 6 days ago he started his scrotum is swollen. Patient uses 2-3 pillows, complaining of orthopnea, exertional dyspnea. He told me that his blood pressure was always difficult to control and he was only taking amlodipine. He denied any recent travel, any chest pain, nausea, vomiting, blurriness of vision, headache, dizziness, loss of consciousness or any fall. In ED patient blood pressure was 118 x 110, saturating 89% on room air, lab work done which showed sodium 136, potassium 4.4, creatinine 0.9, troponin less than 0.01. LFTs unremarkable. Hemoglobin 15.2 likely secondary polycythemia. EKG reviewed which showed sinus tachycardia with right bundle branch block. Chest x-ray reviewed which showed mild pulmonary congestion with moderate cardiomegaly. Patient told me that he does not move around much he was from 8 PM to 6 AM and usually he sits or stands. He lives at home alone but he has few friends which visit in some time. He denied any history of TIA, stroke, heart problems, lung problems.     He he quit smoking 30 years ago now he chews nicotine, he is a daily drinker couple of beers on every day basis. Due to acute respiratory distress he underwent CTA chest which showed no pulmonary embolism, bilateral lung atelectatic changes, cardiomegaly, hepatomegaly and subcutaneous flank edema. Chest x-ray concerning for pulmonary edema, cardiomegaly concerning for pulmonary edema. He underwent scrotal ultrasound which showed normal testicular ultrasound, bilateral hydroceles. His D-dimers were elevated and venous Doppler bilateral lower extremity concerning for age indeterminant thrombus. Subjective (past 24 hours):   Patient received a dose of Lasix 40 mg and 1 mg Bumex in ED yesterday. Patient received 2 doses of metolazone 40 mg. On arrival blood pressure was high received 1 dose of hydralazine 10 mg. Patient is started on Coreg 12.5 and losartan 25 mg. On my evaluation patient morbidly obese, sleeping with oxygen at home 5 L, saturating above 92%, has large testicular swelling, bilateral lower extremity swelling with chronic skin changes. Patient is speaking in full sentences. Still getting short of breath when speaking fast, has distended JVP, decreased lung sounds. Denied any abdominal pain, diarrhea, chest pain. Most recent vitals are 135/80 in a sitting position with heart rate of 85, respiratory rate 18, temperature 98.8, oxygen rate down to 2 L saturating above 92%. ROS: reviewed complete ROS unchanged unless otherwise stated in hospital course/subjective portion.        Medications:  Reviewed    Infusion Medications    sodium chloride       Scheduled Medications    carvedilol  12.5 mg Oral BID    bumetanide  2 mg IntraVENous BID    sodium chloride flush  10 mL IntraVENous 2 times per day    potassium chloride  20 mEq Oral BID WC    losartan  25 mg Oral Daily     PRN Meds: sodium chloride flush, sodium chloride, ondansetron **OR** ondansetron, polyethylene glycol, acetaminophen **OR** acetaminophen, hydrALAZINE        Intake/Output Summary (Last 24 hours) at 7/24/2022 1532  Last data filed at 7/24/2022 1200  Gross per 24 hour   Intake 480 ml   Output 3250 ml   Net -2770 ml       Exam:  /80   Pulse 85   Temp 98.8 °F (37.1 °C) (Tympanic)   Resp 18   Ht 5' 8\" (1.727 m)   Wt (!) 322 lb (146.1 kg)   SpO2 98%   BMI 48.96 kg/m²     General: No distress, appears old for the age. Eyes:  PERRL. Conjunctivae/corneas clear. HENT: Head normal appearing. Nares normal. Oral mucosa moist.  Hearing intact. Neck: Supple, with full range of motion. Trachea midline. Elevated JVD. Respiratory:  Mild crackles on Rt side, decreased lung sounds on left. Cardiovascular: Normal rate, regular rhythm with normal S1/S2 without murmurs. B/l lower extremity edema, chronic skin changes. B/l palpable pulses. Abdomen: Soft, non-tender, distended with normal bowel sounds. Musculoskeletal: No joint swelling or tenderness. Normal tone. No abnormal movements. Skin: Warm and dry. No rashes or lesions. Neurologic:  No focal sensory/motor deficits in the upper or lower extremities. Cranial nerves:  grossly non-focal 2-12. Psychiatric: Alert and oriented, normal insight and thought content. Capillary Refill: Brisk,< 3 seconds. Peripheral Pulses: +2 palpable, equal bilaterally. Labs:   Recent Labs     07/23/22  1520   WBC 5.8   HGB 15.2   HCT 49.0        Recent Labs     07/23/22  1520 07/23/22 2006     --    K 4.4  --    CL 94*  --    CO2 29  --    BUN 7  --    CREATININE 0.9  --    CALCIUM 8.9  --    PHOS  --  4.0     Recent Labs     07/23/22  1520 07/24/22  1030   AST 34 26   ALT 19 16   BILIDIR  --  <0.2   BILITOT 0.4 0.8   ALKPHOS 84 78     Recent Labs     07/24/22  0949   INR 0.98     No results for input(s): Elvin Massed in the last 72 hours. No results for input(s): PROCAL in the last 72 hours.    Lab Results   Component Value Date/Time    NITRU NEGATIVE 07/23/2022 07:20 PM    BLOODU NEGATIVE 07/23/2022 07:20 PM    GLUCOSEU NEGATIVE 07/23/2022 07:20 PM       Radiology (48 hours):  US SCROTUM AND TESTICLES    Result Date: 7/23/2022  1. Normal testicular ultrasound. 2. Bilateral hydroceles. 3. Scrotal swelling, likely secondary to cellulitis. Clinical correlation is required. 4. Normal Doppler study. Final report electronically signed by Dr. Shaheed Hall on 7/23/2022 6:03 PM    XR CHEST PORTABLE    Result Date: 7/23/2022  1. Moderate cardiomegaly with pulmonary vascular congestion suspicious for congestive heart failure. 2. Prominent pulmonary interstitium suspicious for pulmonary edema. **This report has been created using voice recognition software. It may contain minor errors which are inherent in voice recognition technology. ** Final report electronically signed by Dr. Shaheed Hall on 7/23/2022 3:43 PM    VL DUP LOWER EXTREMITY VENOUS BILATERAL    Result Date: 7/24/2022  1. There is nonvisualization of the anterior tibial veins on the right. This may be related to possible age-indeterminate thrombus. However, the right peroneal and posterior tibial veins appear patent. 2. There is nonvisualization of the anterior tibial veins on the left. This may be related to possible age-indeterminate thrombus. However, the left posterior tibial veins appear patent. The left peroneal vein is not well seen. This could be related to possible partial age-indeterminate thrombus. **This report has been created using voice recognition software. It may contain minor errors which are inherent in voice recognition technology. ** Final report electronically signed by Dr. Maximo Quintanilla on 7/24/2022 9:26 AM    CTA CHEST W CONTRAST    Result Date: 7/24/2022  1. No pulmonary embolism. 2. Subsegmental atelectatic change involving the bilateral lungs greatest involving the left lung base. No consolidation. 3. Borderline enlargement of the heart. 4. Hepatomegaly and hepatic steatosis.  5. Subcutaneous edema and stranding within the left flank, image 209 of series 4. This document has been electronically signed by: Laura De León DO on 07/24/2022 01:47 AM All CTs at this facility use dose modulation techniques and iterative reconstructions, and/or weight-based dosing when appropriate to reduce radiation to a low as reasonably achievable. 3D Post-processing was performed on this study. DVT prophylaxis:    [] Lovenox  [] SCDs  [] SQ Heparin  [] Encourage ambulation   [x] Already on Anticoagulation       Diet: ADULT DIET; Regular; Low Sodium (2 gm); 1800 ml  Code Status: Full Code  PT/OT: Bed rest  Tele: Reviewed- sinus rhythm   IVF: Continue diuretics. This transcription was electronically signed. Parts of this transcriptions may have been dictated by use of voice recognition software and electronically transcribed, The transcription may contain errors not detected in proofreading. Barber Hawkins MD  Internal medicine, Hospitalist Service   [facility].

## 2022-07-24 NOTE — CONSULTS
[address]  [address] [facility] [address]  Dept: [phone]  Loc: [phone]  Visit Date: 2022    Urology Consult Note    Reason for Consult: Bilateral scrotal swelling  Requesting Physician: Inpatient medicine service  ______________________________________________    Urologic Impression: Bilateral scrotal swelling. No clinical signs of infected hydroceles. No indication for urologic intervention. Unfortunately, it may take a while for his scrotal swelling to resolve. Urologic Plans / Recommendations: Optimize medical management with aggressive diuresis and scrotal elevation    ______________________________________________    History Obtained From:  patient    Chief Complaint: Scrotal swelling    HISTORY OF PRESENT ILLNESS:                The patient is a 46 y.o. male admitted for CHF and has the following urologic problem: Acute scrotal swelling. Patient says that he always has leg swelling and he had some mild swelling upon admission. However, he says the scrotal swelling is significantly increased over the past 2 days. He denies any difficulties with urination. .    Past Medical History:        Diagnosis Date    CHF (congestive heart failure) ([facility] [address]     Hypertension      Past Surgical History:    History reviewed. No pertinent surgical history. Allergies:  Patient has no known allergies. Social History:  Social History     Socioeconomic History    Marital status: Single     Spouse name: Not on file    Number of children: Not on file    Years of education: Not on file    Highest education level: Not on file   Occupational History    Not on file   Tobacco Use    Smoking status: Former     Types: Cigarettes     Quit date:      Years since quittin.5    Smokeless tobacco: Current     Types: Chew    Tobacco comments:     1 can per day x 32 yrs   Substance and Sexual Activity    Alcohol use: Not on file    Drug use:  Yes Frequency: 7.0 times per week     Types: Marijuana Madi Ramsey     Comment: x 15 yrs    Sexual activity: Not on file   Other Topics Concern    Not on file   Social History Narrative    Not on file     Social Determinants of Health     Financial Resource Strain: Not on file   Food Insecurity: Not on file   Transportation Needs: Not on file   Physical Activity: Not on file   Stress: Not on file   Social Connections: Not on file   Intimate Partner Violence: Not on file   Housing Stability: Not on file     Family History:   History reviewed. No pertinent family history. PHYSICAL EXAM:  VITALS:  /80   Pulse 85   Temp 98.8 °F (37.1 °C) (Tympanic)   Resp 18   Ht 5' 8\" (1.727 m)   Wt (!) 322 lb (146.1 kg)   SpO2 98%   BMI 48.96 kg/m² . Nursing note and vitals reviewed. General Appearance:  no acute distress  HEENT:  normal appearance  Cardiovascular:  no significant peripheral edema  Respiratory:  normal respiratory effort  Gastrointestinal:  abdomen is not distended and is consistent with BMI  Musculoskeletal:  normal range of motion to all extremities  Neurologic: no focal weakness; no tremor  Psychiatric: normal affect, normal train of thought, cooperative  : Significant scrotal swelling. No signs of cellulitis. Nontender. Scrotal rugae is still appreciated.       DATA:  CBC:   Lab Results   Component Value Date/Time    WBC 5.8 07/23/2022 03:20 PM    RBC 5.47 07/23/2022 03:20 PM    HGB 15.2 07/23/2022 03:20 PM    HCT 49.0 07/23/2022 03:20 PM    MCV 89.6 07/23/2022 03:20 PM    MCH 27.8 07/23/2022 03:20 PM    MCHC 31.0 07/23/2022 03:20 PM     07/23/2022 03:20 PM    MPV 8.7 07/23/2022 03:20 PM     BMP:    Lab Results   Component Value Date/Time     07/23/2022 03:20 PM    K 4.3 07/24/2022 06:42 PM    K 4.4 07/23/2022 03:20 PM    CL 94 07/23/2022 03:20 PM    CO2 29 07/23/2022 03:20 PM    BUN 7 07/23/2022 03:20 PM    CREATININE 0.9 07/23/2022 03:20 PM    CALCIUM 8.9 07/23/2022 03:20 PM    LABGLOM 89 07/23/2022 03:20 PM    GLUCOSE 149 07/23/2022 03:20 PM     BUN/Creatinine:    Lab Results   Component Value Date/Time    BUN 7 07/23/2022 03:20 PM    CREATININE 0.9 07/23/2022 03:20 PM     Magnesium:    Lab Results   Component Value Date/Time    MG 2.2 07/23/2022 08:06 PM     Phosphorus:    Lab Results   Component Value Date/Time    PHOS 4.0 07/23/2022 08:06 PM     PT/INR:    Lab Results   Component Value Date/Time    INR 0.99 07/24/2022 04:53 PM     U/A:    Lab Results   Component Value Date/Time    COLORU YELLOW 07/23/2022 07:20 PM    PHUR 6.0 07/23/2022 07:20 PM    LEUKOCYTESUR NEGATIVE 07/23/2022 07:20 PM    UROBILINOGEN 0.2 07/23/2022 07:20 PM    BILIRUBINUR NEGATIVE 07/23/2022 07:20 PM    BLOODU NEGATIVE 07/23/2022 07:20 PM    GLUCOSEU NEGATIVE 07/23/2022 07:20 PM       Imaging: The patient has had the following relevant imaging to this  consult: Scrotal ultrasound,  which I have independently reviewed along with its accompanying report. The study demonstrates mild bilateral hydroceles. Significant scrotal swelling is apparent based on the size of the scrotal walls.   Both testicles bilaterally normal.        Thank you for including us in the care of Salma Covarrubias MD, MD  07/24/22 7:37 PM  Urology

## 2022-07-24 NOTE — ED NOTES
Pt O2 noted to drop while sleeping. Pt stated to have history of sleep apnea. Pt increased to 5L NC while sleeping. Provider notified.       Johnathan Fletcher RN  07/23/22 [id number]       Johnathan Fletcher RN  07/23/22 0505

## 2022-07-24 NOTE — PROGRESS NOTES
Pt unaware of home meds and does not have list or any bottles with him. Pt states he will try to get someone to bring his old bottles in tomorrow. Bp 198/121, provider contacted via perfect serve.

## 2022-07-25 ENCOUNTER — TELEPHONE (OUTPATIENT)
Dept: UROLOGY | Age: 52
End: 2022-07-25

## 2022-07-25 ENCOUNTER — APPOINTMENT (OUTPATIENT)
Dept: INTERVENTIONAL RADIOLOGY/VASCULAR | Age: 52
DRG: 291 | End: 2022-07-25
Payer: COMMERCIAL

## 2022-07-25 PROBLEM — F10.20 ALCOHOL USE DISORDER, SEVERE, DEPENDENCE (HCC): Status: ACTIVE | Noted: 2022-07-25

## 2022-07-25 LAB
ANION GAP SERPL CALCULATED.3IONS-SCNC: 9 MEQ/L (ref 8–16)
BUN BLDV-MCNC: 8 MG/DL (ref 7–22)
CALCIUM IONIZED: 1.04 MMOL/L (ref 1.12–1.32)
CALCIUM SERPL-MCNC: 9.5 MG/DL (ref 8.5–10.5)
CHLORIDE BLD-SCNC: 89 MEQ/L (ref 98–111)
CO2: 37 MEQ/L (ref 23–33)
CREAT SERPL-MCNC: 0.8 MG/DL (ref 0.4–1.2)
GFR SERPL CREATININE-BSD FRML MDRD: > 90 ML/MIN/1.73M2
GLUCOSE BLD-MCNC: 100 MG/DL (ref 70–108)
HEPARIN UNFRACTIONATED: 0.11 U/ML (ref 0.3–0.7)
HEPARIN UNFRACTIONATED: 0.21 U/ML (ref 0.3–0.7)
HEPARIN UNFRACTIONATED: 0.23 U/ML (ref 0.3–0.7)
LV EF: 63 %
LVEF MODALITY: NORMAL
MAGNESIUM: 2.1 MG/DL (ref 1.6–2.4)
PHOSPHORUS: 3.7 MG/DL (ref 2.4–4.7)
POTASSIUM SERPL-SCNC: 3.8 MEQ/L (ref 3.5–5.2)
SODIUM BLD-SCNC: 135 MEQ/L (ref 135–145)

## 2022-07-25 PROCEDURE — 2580000003 HC RX 258: Performed by: INTERNAL MEDICINE

## 2022-07-25 PROCEDURE — 1200000003 HC TELEMETRY R&B

## 2022-07-25 PROCEDURE — 93970 EXTREMITY STUDY: CPT

## 2022-07-25 PROCEDURE — 85520 HEPARIN ASSAY: CPT

## 2022-07-25 PROCEDURE — 6360000002 HC RX W HCPCS: Performed by: STUDENT IN AN ORGANIZED HEALTH CARE EDUCATION/TRAINING PROGRAM

## 2022-07-25 PROCEDURE — 2500000003 HC RX 250 WO HCPCS: Performed by: STUDENT IN AN ORGANIZED HEALTH CARE EDUCATION/TRAINING PROGRAM

## 2022-07-25 PROCEDURE — 99233 SBSQ HOSP IP/OBS HIGH 50: CPT | Performed by: STUDENT IN AN ORGANIZED HEALTH CARE EDUCATION/TRAINING PROGRAM

## 2022-07-25 PROCEDURE — 6370000000 HC RX 637 (ALT 250 FOR IP): Performed by: INTERNAL MEDICINE

## 2022-07-25 PROCEDURE — 99232 SBSQ HOSP IP/OBS MODERATE 35: CPT | Performed by: UROLOGY

## 2022-07-25 PROCEDURE — 82330 ASSAY OF CALCIUM: CPT

## 2022-07-25 PROCEDURE — 36415 COLL VENOUS BLD VENIPUNCTURE: CPT

## 2022-07-25 PROCEDURE — 80048 BASIC METABOLIC PNL TOTAL CA: CPT

## 2022-07-25 PROCEDURE — 83735 ASSAY OF MAGNESIUM: CPT

## 2022-07-25 PROCEDURE — 6370000000 HC RX 637 (ALT 250 FOR IP): Performed by: STUDENT IN AN ORGANIZED HEALTH CARE EDUCATION/TRAINING PROGRAM

## 2022-07-25 PROCEDURE — 93306 TTE W/DOPPLER COMPLETE: CPT

## 2022-07-25 PROCEDURE — 84100 ASSAY OF PHOSPHORUS: CPT

## 2022-07-25 RX ORDER — ENOXAPARIN SODIUM 100 MG/ML
30 INJECTION SUBCUTANEOUS 2 TIMES DAILY
Status: DISCONTINUED | OUTPATIENT
Start: 2022-07-25 | End: 2022-07-26 | Stop reason: HOSPADM

## 2022-07-25 RX ORDER — BUMETANIDE 1 MG/1
2 TABLET ORAL DAILY
Status: DISCONTINUED | OUTPATIENT
Start: 2022-07-26 | End: 2022-07-26 | Stop reason: HOSPADM

## 2022-07-25 RX ADMIN — FOLIC ACID 1 MG: 1 TABLET ORAL at 11:04

## 2022-07-25 RX ADMIN — BUMETANIDE 2 MG: 0.25 INJECTION INTRAMUSCULAR; INTRAVENOUS at 19:50

## 2022-07-25 RX ADMIN — POTASSIUM CHLORIDE 20 MEQ: 1500 TABLET, EXTENDED RELEASE ORAL at 18:48

## 2022-07-25 RX ADMIN — SODIUM CHLORIDE, PRESERVATIVE FREE 10 ML: 5 INJECTION INTRAVENOUS at 19:51

## 2022-07-25 RX ADMIN — BUMETANIDE 2 MG: 0.25 INJECTION INTRAMUSCULAR; INTRAVENOUS at 11:05

## 2022-07-25 RX ADMIN — LOSARTAN POTASSIUM 25 MG: 25 TABLET, FILM COATED ORAL at 11:04

## 2022-07-25 RX ADMIN — ENOXAPARIN SODIUM 30 MG: 100 INJECTION SUBCUTANEOUS at 19:50

## 2022-07-25 RX ADMIN — HEPARIN SODIUM 4970 UNITS: 1000 INJECTION, SOLUTION INTRAVENOUS; SUBCUTANEOUS at 04:31

## 2022-07-25 RX ADMIN — CARVEDILOL 12.5 MG: 6.25 TABLET, FILM COATED ORAL at 11:04

## 2022-07-25 RX ADMIN — HEPARIN SODIUM AND DEXTROSE 14 UNITS/KG/HR: 10000; 5 INJECTION INTRAVENOUS at 04:33

## 2022-07-25 RX ADMIN — Medication 100 MG: at 11:04

## 2022-07-25 RX ADMIN — SODIUM CHLORIDE, PRESERVATIVE FREE 10 ML: 5 INJECTION INTRAVENOUS at 11:06

## 2022-07-25 RX ADMIN — CARVEDILOL 12.5 MG: 6.25 TABLET, FILM COATED ORAL at 18:48

## 2022-07-25 RX ADMIN — POTASSIUM CHLORIDE 20 MEQ: 1500 TABLET, EXTENDED RELEASE ORAL at 11:04

## 2022-07-25 NOTE — CARE COORDINATION
7/25/22, 8:07 AM EDT  DISCHARGE PLANNING EVALUATION:    iAlyn Landry       Admitted: 7/23/2022/ [facility] day: 2   Location: -25/025-A Reason for admit: Anasarca [R60.1]  Scrotal swelling [N50.89]  Acute on chronic systolic congestive heart failure (HCC) [I55.31]  Acute diastolic CHF (congestive heart failure) (HCC) [I50.31]   PMH:  has a past medical history of CHF (congestive heart failure) ([address] and Hypertension. Procedure: 7/23 CXR: Moderate cardiomegaly with pulmonary vascular congestion suspicious for congestive heart failure. Prominent pulmonary interstitium suspicious for pulmonary edema. 7/23 US Scrotum: Bilateral hydroceles. Scrotal swelling, likely secondary to cellulitis  7/24 CTA Chest: No pulmonary embolism. Subsegmental atelectatic change involving the bilateral lungs greatest involving the left lung base. No consolidation. Borderline enlargement of the heart. Hepatomegaly and hepatic steatosis. Subcutaneous edema and stranding within the left flank  7/24 Bilat Tal Dup: nonvisualization of anterior tibial veins on the right. This may be related to possible age-indeterminate thrombus. However, the right peroneal and posterior tibial veins appear patent. There is nonvisualization of the anterior tibial veins on the left. This may be related to possible age-indeterminate thrombus. However, the left posterior tibial veins appear patent. The left peroneal vein is not well seen. This could be related to possible partial age-indeterminate thrombus  7/25 Bilat Tal Dup: Both right and left anterior tibial veins show color flow is no evidence of DVT  7/25 ECHO: completed  Barriers to Discharge:  Admitted through ER 7/26 with scrotal swelling, bilateral lower extremity swelling, and SOB. Reported non-compliance with medications including diuretics at home. Consult to urology. Low sodium diet with 1800 ml fluid restriction. O2 at 1 L. Home Bipap ordered, patient refused overnight.  Bradycardic and pause up to 3 sec overnight. Heparin gtt. IV Bumex 2 mg twice daily. Addiction services consult. PCP: Leopold Primes II, DO  Readmission Risk Score: 9.3%  Patient's Healthcare Decision Maker: Legal Next of Kin  CM spoke with patient about how his emergency contact that is listed is a friend. Discussed the importance of establishing a POA for if he is ever unable to voice his own wishes. He plans to speak with his friend later today and is open to setting up POA if he is willing to take on this responsibility. Patient Goals/Plan/Treatment Preferences: Spoke with Leonila Malik. He states he lives at home with a second cousin. Denies having any HH services. States he has a CPAP and does not wear O2 at home. States usually his prescriptions are affordable, sometimes they are not, voiced concern about possible meds at time of discharge. Discussed Good [facility] and that we would follow up at time of discharge on potential needs for meds at that time. Verified PCP and insurance. Transportation/Food Security/Housekeeping Addressed:  No issues identified.

## 2022-07-25 NOTE — PLAN OF CARE
Problem: Discharge Planning  Goal: Discharge to home or other facility with appropriate resources  Outcome: Progressing  Note: Pt prefers to return home upon discharge. Problem: Safety - Adult  Goal: Free from fall injury  Outcome: Progressing  Note: No falls so far this shift, call light and bedside table within reach. Bed in lowest position and alarm on, nonskid socks on bilaterally. Problem: Skin/Tissue Integrity  Goal: Absence of new skin breakdown  Description: 1. Monitor for areas of redness and/or skin breakdown  2. Assess vascular access sites hourly  3. Every 4-6 hours minimum:  Change oxygen saturation probe site  4. Every 4-6 hours:  If on nasal continuous positive airway pressure, respiratory therapy assess nares and determine need for appliance change or resting period. Outcome: Progressing  Note: No new skin breakdown so far this shift. Pt does turn self in bed. Abrasions/bruising on lower legs bilaterally. Problem: Pain  Goal: Verbalizes/displays adequate comfort level or baseline comfort level  Outcome: Progressing  Note: Pt denies pain at this time, will continue to assess using 0-10 pain scale. Pain goal stated is 0, which is achievable as he is not having pain at this time. Care plan reviewed with patient. Patient verbalize understanding of the plan of care and contribute to goal setting.

## 2022-07-25 NOTE — PROGRESS NOTES
Hospitalist Progress Note    Patient:  Saurabh Duenas    YOB: 1970  Unit/Bed:6K-25/025-A  MRN: [id number]    Acct: [de-identified]   PCP: Rach Koch II, DO    Date of Admission: 7/23/2022      Assessment/Plan:  Acute HFpEF likely secondary to diastolic dysfunction caused by uncontrolled hypertension. Improving, switch Bumex to p.o. 2 mg once daily from tomorrow. Strict input and output. Daily weight. On oxygen maintain oxygen saturation 88 to 92%. Wean off oxygen as tolerated. Awaiting echocardiogram to look for structure and function of myocardium. Fluid restriction. Monitor electrolytes especially hypokalemia replace accordingly. Acute pulmonary edema. Continue diuresis, improving clinically. BiPAP. Use oxygen in between. Acute hypoxic respiratory failure secondary to cardiopulmonary decompensation. Improving. Secondary to above. Essential hypertension. As needed hydralazine. Started on Coreg 12.5 and losartan 25 mg. Obstructive sleep apnea-obesity hypoventilation syndrome BiPAP at night. Hydrocele causing scrotal swelling. Improving, anticipate improvement with diuretics. Alcohol abuse disorder. Thiamine and folate. Watch for withdrawal.      Chronic venous dermatitis. Likely secondary to longstanding untreated heart failure. Continue diuretics. Compression socks at discharge. Morbid obesity. Weight loss              Earlier there was a concern for age indeterminant DVT in few vessels in both lower extremity. Patient was a started on heparin and venous Dopplers were ordered to rule out DVT. Second read was negative. Heparin was discontinued. Expected discharge date:  7/26/2022    CTA showed Small subcentimeter nodes scattered throughout the mediastinum. Results forwarded to PCP.      Disposition:   [x] Home  [] TCU  [] Rehab  [] Psych  [] SNF  [] [facility]  [] Other-    ===================================================================      Chief Complaint: Scrotal swelling, shortness of breath    Hospital Course: 51-year-old gentleman with past medical history of hypertension, obstructive sleep apnea on CPAP compliant and alcohol abuse disorder presented to the hospital complaining of scrotal swelling and shortness of breath. As per patient he has been feeling short of breath for last 1 month and he was supposed to get diuretics but his primary care physician did not send the prescription. Patient also complaining of leg swelling, increasing abdominal girth, and 6 days ago he started his scrotum is swollen. Patient uses 2-3 pillows, complaining of orthopnea, exertional dyspnea. He told me that his blood pressure was always difficult to control and he was only taking amlodipine. He denied any recent travel, any chest pain, nausea, vomiting, blurriness of vision, headache, dizziness, loss of consciousness or any fall. In ED patient blood pressure was 118 x 110, saturating 89% on room air, lab work done which showed sodium 136, potassium 4.4, creatinine 0.9, troponin less than 0.01. LFTs unremarkable. Hemoglobin 15.2 likely secondary polycythemia. EKG reviewed which showed sinus tachycardia with right bundle branch block. Chest x-ray reviewed which showed mild pulmonary congestion with moderate cardiomegaly. Patient told me that he does not move around much he was from 8 PM to 6 AM and usually he sits or stands. He lives at home alone but he has few friends which visit in some time. He denied any history of TIA, stroke, heart problems, lung problems. He he quit smoking 30 years ago now he chews nicotine, he is a daily drinker couple of beers on every day basis.     Due to acute respiratory distress he underwent CTA chest which showed no pulmonary embolism, bilateral lung atelectatic changes, cardiomegaly, hepatomegaly and subcutaneous flank edema.    Chest x-ray concerning for pulmonary edema, cardiomegaly concerning for pulmonary edema. He underwent scrotal ultrasound which showed normal testicular ultrasound, bilateral hydroceles. His D-dimers were elevated and venous Doppler bilateral lower extremity concerning for age indeterminant thrombus. Subjective (past 24 hours):   Patient seen and examined at bedside. Currently off of nasal cannula. Saturating well on room air above 92%. Patient continued to improve, decreasing leg swelling, decrease in the testicular swelling. Hydrocele is much better. Venous Doppler negative did not show any evidence of DVT, heparin was stopped. Lab work done showed sodium 135, potassium 3.8, creatinine 0.8. Patient is 5.8 L net negative. 3.7 L urine output in 24 hours. Hemodynamically and vitally stable. Most recent vitals temp 98.1, respiratory rate 20, pulse 89, blood pressure 145/91. Patient has lost around 18 pounds. ROS: reviewed complete ROS unchanged unless otherwise stated in hospital course/subjective portion.        Medications:  Reviewed    Infusion Medications    sodium chloride       Scheduled Medications    enoxaparin  30 mg SubCUTAneous BID    carvedilol  12.5 mg Oral BID    bumetanide  2 mg IntraVENous BID    thiamine  100 mg Oral Daily    folic acid  1 mg Oral Daily    sodium chloride flush  10 mL IntraVENous 2 times per day    potassium chloride  20 mEq Oral BID WC    losartan  25 mg Oral Daily     PRN Meds: sodium chloride flush, sodium chloride, ondansetron **OR** ondansetron, polyethylene glycol, acetaminophen **OR** acetaminophen, hydrALAZINE        Intake/Output Summary (Last 24 hours) at 7/25/2022 1631  Last data filed at 7/25/2022 0919  Gross per 24 hour   Intake 687.57 ml   Output 3765 ml   Net -3077.43 ml         Exam:  BP (!) 145/91   Pulse 89   Temp 98.1 °F (36.7 °C) (Oral)   Resp 20   Ht 5' 8\" (1.727 m)   Wt (!) 304 lb 7.3 oz (138.1 kg)   SpO2 (!) 88%   BMI 46.29 kg/m²     General: No distress, appears old for the age. Eyes:  PERRL. Conjunctivae/corneas clear. HENT: Head normal appearing. Nares normal. Oral mucosa moist.  Hearing intact. Neck: Supple, with full range of motion. Trachea midline. Elevated JVD. Respiratory:  Mild crackles on Rt side, decreased lung sounds on left. Cardiovascular: Normal rate, regular rhythm with normal S1/S2 without murmurs. B/l lower extremity edema, chronic skin changes. B/l palpable pulses. Abdomen: Soft, non-tender, distended with normal bowel sounds. Musculoskeletal: No joint swelling or tenderness. Normal tone. No abnormal movements. Skin: Warm and dry. No rashes or lesions. Neurologic:  No focal sensory/motor deficits in the upper or lower extremities. Cranial nerves:  grossly non-focal 2-12. Psychiatric: Alert and oriented, normal insight and thought content. Capillary Refill: Brisk,< 3 seconds. Peripheral Pulses: +2 palpable, equal bilaterally. Labs:   Recent Labs     07/23/22  1520   WBC 5.8   HGB 15.2   HCT 49.0          Recent Labs     07/23/22  1520 07/23/22  2006 07/24/22  1842 07/25/22  0350     --   --  135   K 4.4  --  4.3 3.8   CL 94*  --   --  89*   CO2 29  --   --  37*   BUN 7  --   --  8   CREATININE 0.9  --   --  0.8   CALCIUM 8.9  --   --  9.5   PHOS  --  4.0  --  3.7       Recent Labs     07/23/22  1520 07/24/22  1030   AST 34 26   ALT 19 16   BILIDIR  --  <0.2   BILITOT 0.4 0.8   ALKPHOS 84 78       Recent Labs     07/24/22  0949 07/24/22  1653   INR 0.98 0.99       No results for input(s): CKTOTAL, TROPONINI in the last 72 hours. No results for input(s): PROCAL in the last 72 hours. Lab Results   Component Value Date/Time    NITRU NEGATIVE 07/23/2022 07:20 PM    BLOODU NEGATIVE 07/23/2022 07:20 PM    GLUCOSEU NEGATIVE 07/23/2022 07:20 PM       Radiology (48 hours):  US SCROTUM AND TESTICLES    Result Date: 7/23/2022  1. Normal testicular ultrasound.  2. Bilateral hydroceles. 3. Scrotal swelling, likely secondary to cellulitis. Clinical correlation is required. 4. Normal Doppler study. Final report electronically signed by Dr. Ortiz Lima on 7/23/2022 6:03 PM    XR CHEST PORTABLE    Result Date: 7/23/2022  1. Moderate cardiomegaly with pulmonary vascular congestion suspicious for congestive heart failure. 2. Prominent pulmonary interstitium suspicious for pulmonary edema. **This report has been created using voice recognition software. It may contain minor errors which are inherent in voice recognition technology. ** Final report electronically signed by Dr. Ortiz Lima on 7/23/2022 3:43 PM    VL DUP LOWER EXTREMITY VENOUS BILATERAL    Result Date: 7/24/2022  1. There is nonvisualization of the anterior tibial veins on the right. This may be related to possible age-indeterminate thrombus. However, the right peroneal and posterior tibial veins appear patent. 2. There is nonvisualization of the anterior tibial veins on the left. This may be related to possible age-indeterminate thrombus. However, the left posterior tibial veins appear patent. The left peroneal vein is not well seen. This could be related to possible partial age-indeterminate thrombus. **This report has been created using voice recognition software. It may contain minor errors which are inherent in voice recognition technology. ** Final report electronically signed by Dr. Gemma Gaines on 7/24/2022 9:26 AM    CTA CHEST W CONTRAST    Result Date: 7/24/2022  1. No pulmonary embolism. 2. Subsegmental atelectatic change involving the bilateral lungs greatest involving the left lung base. No consolidation. 3. Borderline enlargement of the heart. 4. Hepatomegaly and hepatic steatosis. 5. Subcutaneous edema and stranding within the left flank, image 209 of series 4.  This document has been electronically signed by: Mitzy Burciaga DO on 07/24/2022 01:47 AM All CTs at this facility use dose modulation techniques and iterative reconstructions, and/or weight-based dosing when appropriate to reduce radiation to a low as reasonably achievable. 3D Post-processing was performed on this study. DVT prophylaxis:    [x] Lovenox  [] SCDs  [] SQ Heparin  [] Encourage ambulation   [] Already on Anticoagulation       Diet: ADULT DIET; Regular; Low Sodium (2 gm); 1800 ml  Code Status: Full Code  PT/OT:   Tele: Reviewed- sinus rhythm   IVF: Continue diuretics. This transcription was electronically signed. Parts of this transcriptions may have been dictated by use of voice recognition software and electronically transcribed, The transcription may contain errors not detected in proofreading. Tu Massey MD  Internal medicine, Hospitalist Service   [facility].

## 2022-07-25 NOTE — PLAN OF CARE
Problem: Discharge Planning  Goal: Discharge to home or other facility with appropriate resources  Outcome: Progressing Towards Goal  Flowsheets (Taken 7/23/2022 2302 by Aaron Barbosa RN)  Discharge to home or other facility with appropriate resources:   Identify discharge learning needs (meds, wound care, etc)   Refer to discharge planning if patient needs post-hospital services based on physician order or complex needs related to functional status, cognitive ability or social support system   Identify barriers to discharge with patient and caregiver     Problem: Safety - Adult  Goal: Free from fall injury  Outcome: Progressing Towards Goal  Flowsheets (Taken 7/25/2022 0800)  Free From Fall Injury: Instruct family/caregiver on patient safety     Problem: Skin/Tissue Integrity  Goal: Absence of new skin breakdown  Description: 1. Monitor for areas of redness and/or skin breakdown  2. Assess vascular access sites hourly  3. Every 4-6 hours minimum:  Change oxygen saturation probe site  4. Every 4-6 hours:  If on nasal continuous positive airway pressure, respiratory therapy assess nares and determine need for appliance change or resting period.   Outcome: Progressing Towards Goal     Problem: ABCDS Injury Assessment  Goal: Absence of physical injury  Outcome: Progressing Towards Goal     Problem: Pain  Goal: Verbalizes/displays adequate comfort level or baseline comfort level  Outcome: Progressing Towards Goal     Problem: Genitourinary - Adult  Goal: Absence of urinary retention  Outcome: Progressing Towards Goal  Flowsheets (Taken 7/25/2022 1756)  Absence of urinary retention:   Assess patients ability to void and empty bladder   Monitor intake/output and perform bladder scan as needed     Problem: Metabolic/Fluid and Electrolytes - Adult  Goal: Hemodynamic stability and optimal renal function maintained  Outcome: Progressing Towards Goal  Flowsheets (Taken 7/25/2022 1756)  Hemodynamic stability and optimal DISPLAY PLAN FREE TEXT renal function maintained:   Monitor labs and assess for signs and symptoms of volume excess or deficit   Monitor intake, output and patient weight   Instruct patient on fluid and nutrition restrictions as appropriate     Problem: Chronic Conditions and Co-morbidities  Goal: Patient's chronic conditions and co-morbidity symptoms are monitored and maintained or improved  Outcome: Progressing Towards Goal

## 2022-07-25 NOTE — FLOWSHEET NOTE
07/24/22 7935   Treatment Team Notification   Reason for Communication Evaluate   Team Member Name Cordelia Davis NP   Treatment Team Role Advanced Practice Nurse   Method of Communication Secure Message   Response See orders   Notification Time 373     Cordelia Davis NP updated regarding pt having a 3 second pause, HR back into 80's right afterwards. Pt remains asymptomatic at this time and stable.

## 2022-07-25 NOTE — PROGRESS NOTES
Not on file   Housing Stability: Not on file     History reviewed. No pertinent family history. No Known Allergies      Constitutional: Alert and oriented times x3, no acute distress, and cooperative to examination with appropriate mood and affect. HEENT:   Head:         Normocephalic and atraumatic. Mucous membranes are normal.   Eyes:         EOM are normal.  Nose:    The external appearance of the nose is normal  Ears: The ears appear normal to external inspection. Cardiovascular:       Normal rate, regular rhythm. Pulmonary/Chest:  Normal respiratory rate and rhthym. No use of accessory muscles. Lungs clear bilaterally. Abdominal:          Soft. No tenderness. Active bowel sounds. Genitalia:    Voiding yellow urine. Significant scrotal swelling. No signs of cellulitis. Nontender. Scrotal rugae is still appreciated. Musculoskeletal:    Normal range of motion. He exhibits no edema or tenderness of lower extremities. Extremities:    No cyanosis, clubbing, or edema present. Neurological:    Alert and oriented. Labs:  WBC:    Lab Results   Component Value Date/Time    WBC 5.8 07/23/2022 03:20 PM     Hemoglobin/Hematocrit:    Lab Results   Component Value Date/Time    HGB 15.2 07/23/2022 03:20 PM    HCT 49.0 07/23/2022 03:20 PM     BMP:    Lab Results   Component Value Date/Time     07/25/2022 03:50 AM    K 3.8 07/25/2022 03:50 AM    K 4.4 07/23/2022 03:20 PM    CL 89 07/25/2022 03:50 AM    CO2 37 07/25/2022 03:50 AM    BUN 8 07/25/2022 03:50 AM    LABALBU 3.8 07/24/2022 10:30 AM    CREATININE 0.8 07/25/2022 03:50 AM    CALCIUM 9.5 07/25/2022 03:50 AM    LABGLOM >90 07/25/2022 03:50 AM       Impression/Plan:  Urology not planning any surgical intervention  JADEN reviewed by Dr Tiffany Da Silva it shows:  mild bilateral hydroceles. Significant scrotal swelling is apparent based on the size of the scrotal walls.   Both testicles bilaterally normal.    Ice  Elevate scrotum  Scrotal support Aggressive diuresis  He reports scrotal swelling improved - discussed with patient, it will take time for swelling to decrease, should see slow improvement     Our office will coordinate follow up in 4-6 wks  Urology signing off, please call with questions/concerns  Call if having problems voiding, may need shipman catheter due to swelling    JULIENNE Hummel - PELON, JULIENNE  07/25/22 1:57 PM  Urology

## 2022-07-26 VITALS
HEIGHT: 68 IN | DIASTOLIC BLOOD PRESSURE: 68 MMHG | OXYGEN SATURATION: 91 % | BODY MASS INDEX: 44.41 KG/M2 | SYSTOLIC BLOOD PRESSURE: 114 MMHG | HEART RATE: 87 BPM | RESPIRATION RATE: 20 BRPM | WEIGHT: 292.99 LBS | TEMPERATURE: 98.2 F

## 2022-07-26 LAB
ANION GAP SERPL CALCULATED.3IONS-SCNC: 16 MEQ/L (ref 8–16)
BASOPHILS # BLD: 0.4 %
BASOPHILS ABSOLUTE: 0 THOU/MM3 (ref 0–0.1)
BUN BLDV-MCNC: 21 MG/DL (ref 7–22)
CALCIUM SERPL-MCNC: 10.1 MG/DL (ref 8.5–10.5)
CHLORIDE BLD-SCNC: 83 MEQ/L (ref 98–111)
CO2: 33 MEQ/L (ref 23–33)
CREAT SERPL-MCNC: 1.1 MG/DL (ref 0.4–1.2)
EOSINOPHIL # BLD: 2.2 %
EOSINOPHILS ABSOLUTE: 0.2 THOU/MM3 (ref 0–0.4)
ERYTHROCYTE [DISTWIDTH] IN BLOOD BY AUTOMATED COUNT: 15.5 % (ref 11.5–14.5)
ERYTHROCYTE [DISTWIDTH] IN BLOOD BY AUTOMATED COUNT: 48.1 FL (ref 35–45)
GFR SERPL CREATININE-BSD FRML MDRD: 70 ML/MIN/1.73M2
GLUCOSE BLD-MCNC: 117 MG/DL (ref 70–108)
HCT VFR BLD CALC: 53.5 % (ref 42–52)
HEMOGLOBIN: 16.6 GM/DL (ref 14–18)
HEPARIN UNFRACTIONATED: < 0.04 U/ML (ref 0.3–0.7)
IMMATURE GRANS (ABS): 0.03 THOU/MM3 (ref 0–0.07)
IMMATURE GRANULOCYTES: 0.4 %
LYMPHOCYTES # BLD: 9.5 %
LYMPHOCYTES ABSOLUTE: 0.7 THOU/MM3 (ref 1–4.8)
MCH RBC QN AUTO: 27.4 PG (ref 26–33)
MCHC RBC AUTO-ENTMCNC: 31 GM/DL (ref 32.2–35.5)
MCV RBC AUTO: 88.4 FL (ref 80–94)
MONOCYTES # BLD: 9.7 %
MONOCYTES ABSOLUTE: 0.7 THOU/MM3 (ref 0.4–1.3)
NUCLEATED RED BLOOD CELLS: 0 /100 WBC
PLATELET # BLD: 205 THOU/MM3 (ref 130–400)
PMV BLD AUTO: 9.2 FL (ref 9.4–12.4)
POTASSIUM REFLEX MAGNESIUM: 4.3 MEQ/L (ref 3.5–5.2)
RBC # BLD: 6.05 MILL/MM3 (ref 4.7–6.1)
SEG NEUTROPHILS: 77.8 %
SEGMENTED NEUTROPHILS ABSOLUTE COUNT: 5.7 THOU/MM3 (ref 1.8–7.7)
SODIUM BLD-SCNC: 132 MEQ/L (ref 135–145)
WBC # BLD: 7.3 THOU/MM3 (ref 4.8–10.8)

## 2022-07-26 PROCEDURE — 97116 GAIT TRAINING THERAPY: CPT

## 2022-07-26 PROCEDURE — 94761 N-INVAS EAR/PLS OXIMETRY MLT: CPT

## 2022-07-26 PROCEDURE — 6360000002 HC RX W HCPCS: Performed by: STUDENT IN AN ORGANIZED HEALTH CARE EDUCATION/TRAINING PROGRAM

## 2022-07-26 PROCEDURE — 85025 COMPLETE CBC W/AUTO DIFF WBC: CPT

## 2022-07-26 PROCEDURE — 2580000003 HC RX 258: Performed by: INTERNAL MEDICINE

## 2022-07-26 PROCEDURE — 6370000000 HC RX 637 (ALT 250 FOR IP): Performed by: STUDENT IN AN ORGANIZED HEALTH CARE EDUCATION/TRAINING PROGRAM

## 2022-07-26 PROCEDURE — 6370000000 HC RX 637 (ALT 250 FOR IP): Performed by: INTERNAL MEDICINE

## 2022-07-26 PROCEDURE — 36415 COLL VENOUS BLD VENIPUNCTURE: CPT

## 2022-07-26 PROCEDURE — 99239 HOSP IP/OBS DSCHRG MGMT >30: CPT | Performed by: STUDENT IN AN ORGANIZED HEALTH CARE EDUCATION/TRAINING PROGRAM

## 2022-07-26 PROCEDURE — 80048 BASIC METABOLIC PNL TOTAL CA: CPT

## 2022-07-26 PROCEDURE — 97161 PT EVAL LOW COMPLEX 20 MIN: CPT

## 2022-07-26 PROCEDURE — 85520 HEPARIN ASSAY: CPT

## 2022-07-26 RX ORDER — LANOLIN ALCOHOL/MO/W.PET/CERES
100 CREAM (GRAM) TOPICAL DAILY
Qty: 30 TABLET | Refills: 3 | Status: SHIPPED | OUTPATIENT
Start: 2022-07-27

## 2022-07-26 RX ORDER — CARVEDILOL 12.5 MG/1
12.5 TABLET ORAL 2 TIMES DAILY
Qty: 60 TABLET | Refills: 3 | Status: SHIPPED | OUTPATIENT
Start: 2022-07-26

## 2022-07-26 RX ORDER — LOSARTAN POTASSIUM 25 MG/1
25 TABLET ORAL DAILY
Qty: 30 TABLET | Refills: 3 | Status: SHIPPED | OUTPATIENT
Start: 2022-07-27

## 2022-07-26 RX ORDER — BUMETANIDE 1 MG/1
1 TABLET ORAL DAILY
Qty: 60 TABLET | Refills: 1 | Status: SHIPPED | OUTPATIENT
Start: 2022-07-27

## 2022-07-26 RX ORDER — FOLIC ACID 1 MG/1
1 TABLET ORAL DAILY
Qty: 30 TABLET | Refills: 3 | Status: SHIPPED | OUTPATIENT
Start: 2022-07-27

## 2022-07-26 RX ADMIN — CARVEDILOL 12.5 MG: 6.25 TABLET, FILM COATED ORAL at 10:44

## 2022-07-26 RX ADMIN — BUMETANIDE 2 MG: 1 TABLET ORAL at 10:44

## 2022-07-26 RX ADMIN — POTASSIUM CHLORIDE 20 MEQ: 1500 TABLET, EXTENDED RELEASE ORAL at 18:17

## 2022-07-26 RX ADMIN — ENOXAPARIN SODIUM 30 MG: 100 INJECTION SUBCUTANEOUS at 10:48

## 2022-07-26 RX ADMIN — Medication 100 MG: at 10:49

## 2022-07-26 RX ADMIN — FOLIC ACID 1 MG: 1 TABLET ORAL at 10:49

## 2022-07-26 RX ADMIN — SODIUM CHLORIDE, PRESERVATIVE FREE 10 ML: 5 INJECTION INTRAVENOUS at 10:50

## 2022-07-26 RX ADMIN — POTASSIUM CHLORIDE 20 MEQ: 1500 TABLET, EXTENDED RELEASE ORAL at 10:49

## 2022-07-26 RX ADMIN — CARVEDILOL 12.5 MG: 6.25 TABLET, FILM COATED ORAL at 18:18

## 2022-07-26 RX ADMIN — LOSARTAN POTASSIUM 25 MG: 25 TABLET, FILM COATED ORAL at 10:49

## 2022-07-26 NOTE — DISCHARGE INSTR - DIET
Good nutrition is important when healing from an illness, injury, or surgery. Follow any nutrition recommendations given to you during your hospital stay. If you were given an oral nutrition supplement while in the hospital, continue to take this supplement at home. You can take it with meals, in-between meals, and/or before bedtime. These supplements can be purchased at most local grocery stores, pharmacies, and chain [employer]-stores. If you have any questions about your diet or nutrition, call the hospital and ask for the dietitian. ADULT DIET; Regular;  Low Sodium (2 gm); 1800 ml Fluid Restriction follow up with you family physician in one week

## 2022-07-26 NOTE — PROGRESS NOTES
Discharge teaching and instructions for diagnosis of CHF completed with patient using teachback method. AVS reviewed. Meds to beds prescriptions given to patient. Patient voiced understanding regarding prescriptions, follow up appointments, and care of self at home.  Discharged ambulatory to  home with support per friend

## 2022-07-26 NOTE — PROGRESS NOTES
Discussed the importance of good shoes and compression socks for the patient with the type of work the patient does. He states that he stands and walks all day and must wear steel toed boots. Also went over the importance of taking his medications as prescribed using the teach back method. Patient verbalized understanding.

## 2022-07-26 NOTE — PLAN OF CARE
Problem: Discharge Planning  Goal: Discharge to home or other facility with appropriate resources  7/26/2022 1640 by Jose Enrique Dominguez RN  Outcome: Progressing Towards Goal  7/26/2022 1639 by Jose Enrique Dominguez RN  Flowsheets (Taken 7/25/2022 1930 by Nilesh Ochoa RN)  Discharge to home or other facility with appropriate resources: Identify barriers to discharge with patient and caregiver  7/26/2022 0441 by Nilesh Ochoa RN  Outcome: Progressing Towards Goal  7/26/2022 0439 by Nilesh Ochoa RN  Outcome: Progressing Towards Goal  Flowsheets (Taken 7/25/2022 1930)  Discharge to home or other facility with appropriate resources: Identify barriers to discharge with patient and caregiver     Problem: Safety - Adult  Goal: Free from fall injury  7/26/2022 1640 by Jose Enrique Dominguez RN  Outcome: Progressing Towards Goal  7/26/2022 1639 by Jose Enrique Dominguez RN  Flowsheets (Taken 7/25/2022 2340 by Nilesh Ochoa RN)  Free From Fall Injury:   Instruct family/caregiver on patient safety   Based on caregiver fall risk screen, instruct family/caregiver to ask for assistance with transferring infant if caregiver noted to have fall risk factors  7/26/2022 0441 by Nilesh Ochoa RN  Outcome: Progressing Towards Goal  Note: Bed locked & in low position, call light in reach, side-rails up x2, bed/chair alarm utilized, non-slip socks on when ambulating, reminded patient to use call light to call for assistance. 7/26/2022 0439 by Nilesh Ochoa RN  Outcome: Progressing Towards Goal  Flowsheets (Taken 7/25/2022 2340)  Free From Fall Injury:   Instruct family/caregiver on patient safety   Based on caregiver fall risk screen, instruct family/caregiver to ask for assistance with transferring infant if caregiver noted to have fall risk factors  Note: Bed locked & in low position, call light in reach, side-rails up x2, bed/chair alarm utilized, non-slip socks on when ambulating, reminded patient to use call light to call for assistance. Problem: Skin/Tissue Integrity  Goal: Absence of new skin breakdown  Description: 1. Monitor for areas of redness and/or skin breakdown  2. Assess vascular access sites hourly  3. Every 4-6 hours minimum:  Change oxygen saturation probe site  4. Every 4-6 hours:  If on nasal continuous positive airway pressure, respiratory therapy assess nares and determine need for appliance change or resting period. 7/26/2022 1640 by Sebastian Cordova RN  Outcome: Progressing Towards Goal  7/26/2022 0441 by Singh Hammond RN  Outcome: Progressing Towards Goal  Note: Ongoing assessment & interventions provided throughout shift. Skin assessments provided. Encouraging/assisting patient to turn as needed. 7/26/2022 0439 by Singh Hammond RN  Outcome: Progressing Towards Goal  Note: Ongoing assessment & interventions provided throughout shift. Skin assessments provided. Encouraging/assisting patient to turn as needed.       Problem: ABCDS Injury Assessment  Goal: Absence of physical injury  7/26/2022 1640 by Sebastian Cordova RN  Outcome: Progressing Towards Goal  7/26/2022 1639 by Sebastian Cordova RN  Flowsheets (Taken 7/25/2022 2340 by Singh Hammond RN)  Absence of Physical Injury: Implement safety measures based on patient assessment  7/26/2022 0441 by Singh Hammond RN  Outcome: Progressing Towards Goal  7/26/2022 0439 by Singh Hammond RN  Outcome: Progressing Towards Goal  Flowsheets (Taken 7/25/2022 2340)  Absence of Physical Injury: Implement safety measures based on patient assessment     Problem: Pain  Goal: Verbalizes/displays adequate comfort level or baseline comfort level  7/26/2022 1640 by Sebastian Cordova RN  Outcome: Progressing Towards Goal  7/26/2022 1639 by Sebastian Cordova RN  Flowsheets (Taken 7/26/2022 1639)  Verbalizes/displays adequate comfort level or baseline comfort level:   Encourage patient to monitor pain and request assistance   Assess pain using appropriate pain scale   Implement non-pharmacological measures as appropriate and evaluate response  7/26/2022 0441 by Dax Conteh RN  Outcome: Progressing Towards Goal  Note: Ongoing assessment & interventions provided throughout shift. Reminded patient to report any pain, pressure, or shortness of breath to the nurse. 7/26/2022 0439 by Dax Conteh RN  Outcome: Progressing Towards Goal  Note: Ongoing assessment & interventions provided throughout shift. Reminded patient to report any pain, pressure, or shortness of breath to the nurse.       Problem: Genitourinary - Adult  Goal: Absence of urinary retention  7/26/2022 1640 by Loren Almanza RN  Outcome: Progressing Towards Goal  7/26/2022 1639 by Loren Almanza RN  Flowsheets (Taken 7/25/2022 1930 by Dax Conteh RN)  Absence of urinary retention: Assess patients ability to void and empty bladder  7/26/2022 0441 by Dax Conteh RN  Outcome: Progressing Towards Goal  7/26/2022 0439 by Dax Conteh RN  Outcome: Progressing Towards Goal  Flowsheets (Taken 7/25/2022 1930)  Absence of urinary retention: Assess patients ability to void and empty bladder     Problem: Metabolic/Fluid and Electrolytes - Adult  Goal: Hemodynamic stability and optimal renal function maintained  7/26/2022 1640 by Loren Almanza RN  Outcome: Progressing Towards Goal  7/26/2022 1639 by Loren Almanza RN  Flowsheets (Taken 7/25/2022 1930 by Dax Conteh RN)  Hemodynamic stability and optimal renal function maintained: Monitor labs and assess for signs and symptoms of volume excess or deficit  7/26/2022 0441 by Dax Conteh RN  Outcome: Progressing Towards Goal  7/26/2022 0439 by Dax Conteh RN  Outcome: Progressing Towards Goal  Flowsheets (Taken 7/25/2022 1930)  Hemodynamic stability and optimal renal function maintained: Monitor labs and assess for signs and symptoms of volume excess or deficit     Problem: Chronic Conditions and Co-morbidities  Goal: Patient's chronic conditions and co-morbidity symptoms are monitored and maintained or improved  7/26/2022 1640 by Hua Lizama RN  Outcome: Progressing Towards Goal  7/26/2022 1639 by Hua Lizama RN  Flowsheets (Taken 7/25/2022 1930 by Tico Combs RN)  Care Plan - Patient's Chronic Conditions and Co-Morbidity Symptoms are Monitored and Maintained or Improved: Monitor and assess patient's chronic conditions and comorbid symptoms for stability, deterioration, or improvement  7/26/2022 0441 by Tico Combs RN  Outcome: Progressing Towards Goal  7/26/2022 0439 by Tico Combs RN  Outcome: Progressing Towards Goal  Flowsheets (Taken 7/25/2022 1930)  Care Plan - Patient's Chronic Conditions and Co-Morbidity Symptoms are Monitored and Maintained or Improved: Monitor and assess patient's chronic conditions and comorbid symptoms for stability, deterioration, or improvement

## 2022-07-26 NOTE — PROGRESS NOTES
[address] [id number]  INPATIENT PHYSICAL THERAPY  EVALUATION  [facility] RENAL TELEMETRY 6K - 6K-25/025-A    Time In: 09  Time Out: 09  Timed Code Treatment Minutes: 9 Minutes  Minutes: 19          Date: 2022  Patient Name: Nba Avendano,  Gender:  male        MRN: [id number]  : 1970  (46 y.o.)      Referring Practitioner: Serina Rico MD  Diagnosis: anasarca  Additional Pertinent Hx: Per EMR \"46year-old male patient who is morbidly obese. Presented to the ED today with a 5-day history of increased bilateral lower extremity swelling, scrotal  swelling without pain, shortness of breath at rest and on exertion . However , his symptoms have been going on for more than 2 weeks because he ran out of diuretics. He denies any chest pain, chest pressure, palpitations, nausea vomiting, and no diarrhea. \"     Restrictions/Precautions:  Restrictions/Precautions: General Precautions    Subjective:  Chart Reviewed: Yes  Patient assessed for rehabilitation services?: Yes  Family / Caregiver Present: No  Subjective: Ok to see pt per nursing. Pt in bed when PT arrived, agreeable to PT session with min encouragement. Communicated with nursing and tech following session, ok for pt to be IND in room.  Pt reports he has been amb to commode and back with no assist.     General:  Overall Orientation Status: Within Normal Limits  Orientation Level: Oriented X4  Vision: Impaired  Vision Exceptions: Wears glasses at all times  Hearing: Within functional limits       Pain: IV site, did not quantify    Vitals: Oxygen: room air, decreased to 83% following gait with quick recovery    Social/Functional History:    Lives With: Other (comment) (aunts step daughter)  Type of Home: House  Home Layout: One level  Home Access: Stairs to enter with rails  Entrance Stairs - Number of Steps: 4 KATELYN  Entrance Stairs - Rails: Both  Home Equipment:  (no DME)     Bathroom Shower/Tub: Walk-in shower  Bathroom Toilet: Standard endurance, Increased pain  Assessment: Pt is currently at baseline for functional mobility, no further skilled PT services warranted at this time. Therapy Prognosis: Good    Requires PT Follow-Up: No    Discharge Recommendations:  Discharge Recommendations: Home with assist PRN    Patient Education:      . Patient Education  Education Given To: Patient  Education Provided: Role of Therapy, Plan of Care  Education Method: Verbal  Education Outcome: Verbalized understanding       Equipment Recommendations:  Equipment Needed: No    Plan:  Plan:  (NA, pt d/c)    Goals:  Patient goals : get home  Short Term Goals  Time Frame for Short term goals: NA  Long Term Goals  Time Frame for Long term goals : NA    Following session, patient left in safe position with all fall risk precautions in place. Sitting on EOB at end of session.

## 2022-07-26 NOTE — PROGRESS NOTES
Hospitalist Progress Note    Patient:  Iván Saez    YOB: 1970  Unit/Bed:6K-25/025-A  MRN: [id number]    Acct: [de-identified]   PCP: Ally Caruso II,     Date of Admission: 7/23/2022      Assessment/Plan:  Acute HFpEF likely secondary to diastolic dysfunction caused by uncontrolled hypertension. Improved. Start on bumex 1 mg. BMP in a week. Follow up with PCP. Lost 28 LB. Off of oxygen, did not qualify for home O2. .  Echo reviewed no diastolic dysfunction. Acute pulmonary edema. Resolved. Acute hypoxic respiratory failure secondary to cardiopulmonary decompensation. Resolved     Essential hypertension. Controled, started on losrtan 25 mg, coreg 12. 5. continue Bumex 1 mg. Obstructive sleep apnea-obesity hypoventilation syndrome BiPAP at night. Hydrocele causing scrotal swelling. Much better. Alcohol abuse disorder. Thiamine and folate. Chronic venous dermatitis. Likely secondary to longstanding untreated heart failure. Continue diuretics. Compression socks at discharge. Morbid obesity. Weight loss    Hypotonic hyponatremia- Likely secondary to diuresis- anticipate improvement as patient is going home on low dose of diuretics. BMP in a week, follow up with PCP with results. Expected discharge date:  7/26/2022    CTA showed Small subcentimeter nodes scattered throughout the mediastinum. Results forwarded to PCP. Disposition:   [x] Home  [] TCU  [] Rehab  [] Psych  [] SNF  [] [facility]  [] Other-    ===================================================================      Chief Complaint: Scrotal swelling, shortness of breath    Hospital Course: 57-year-old gentleman with past medical history of hypertension, obstructive sleep apnea on CPAP compliant and alcohol abuse disorder presented to the hospital complaining of scrotal swelling and shortness of breath.   As per patient he has been feeling short of breath for last 1 month and he was supposed to get diuretics but his primary care physician did not send the prescription. Patient also complaining of leg swelling, increasing abdominal girth, and 6 days ago he started his scrotum is swollen. Patient uses 2-3 pillows, complaining of orthopnea, exertional dyspnea. He told me that his blood pressure was always difficult to control and he was only taking amlodipine. He denied any recent travel, any chest pain, nausea, vomiting, blurriness of vision, headache, dizziness, loss of consciousness or any fall. In ED patient blood pressure was 118 x 110, saturating 89% on room air, lab work done which showed sodium 136, potassium 4.4, creatinine 0.9, troponin less than 0.01. LFTs unremarkable. Hemoglobin 15.2 likely secondary polycythemia. EKG reviewed which showed sinus tachycardia with right bundle branch block. Chest x-ray reviewed which showed mild pulmonary congestion with moderate cardiomegaly. Patient told me that he does not move around much he was from 8 PM to 6 AM and usually he sits or stands. He lives at home alone but he has few friends which visit in some time. He denied any history of TIA, stroke, heart problems, lung problems. He he quit smoking 30 years ago now he chews nicotine, he is a daily drinker couple of beers on every day basis. Due to acute respiratory distress he underwent CTA chest which showed no pulmonary embolism, bilateral lung atelectatic changes, cardiomegaly, hepatomegaly and subcutaneous flank edema. Chest x-ray concerning for pulmonary edema, cardiomegaly concerning for pulmonary edema. He underwent scrotal ultrasound which showed normal testicular ultrasound, bilateral hydroceles. His D-dimers were elevated and venous Doppler bilateral lower extremity concerning for age indeterminant thrombus. Subjective (past 24 hours):   Patient seen and examined at bedside. On room air  Able to walk, did not qualify for home o2.  Swelling has gone down, patient feels good. Patient is 7 L net negative. 2 L urine output in 24 hours. Hemodynamically and vitally stable. Most recent vitals temp 98.2, respiratory rate 20, pulse 87, blood pressure 114/68. Patient has lost around 28 pounds. Na 132, K 4.3      ROS: reviewed complete ROS unchanged unless otherwise stated in hospital course/subjective portion. Medications:  Reviewed    Infusion Medications    sodium chloride       Scheduled Medications    enoxaparin  30 mg SubCUTAneous BID    bumetanide  2 mg Oral Daily    carvedilol  12.5 mg Oral BID    thiamine  100 mg Oral Daily    folic acid  1 mg Oral Daily    sodium chloride flush  10 mL IntraVENous 2 times per day    potassium chloride  20 mEq Oral BID WC    losartan  25 mg Oral Daily     PRN Meds: sodium chloride flush, sodium chloride, ondansetron **OR** ondansetron, polyethylene glycol, acetaminophen **OR** acetaminophen, hydrALAZINE        Intake/Output Summary (Last 24 hours) at 7/26/2022 1618  Last data filed at 7/26/2022 1044  Gross per 24 hour   Intake 320 ml   Output 2000 ml   Net -1680 ml         Exam:  /68   Pulse 87   Temp 98.2 °F (36.8 °C) (Oral)   Resp 20   Ht 5' 8\" (1.727 m)   Wt 292 lb 15.9 oz (132.9 kg)   SpO2 91%   BMI 44.55 kg/m²     General: No distress, appears old for the age. Eyes:  PERRL. Conjunctivae/corneas clear. HENT: Head normal appearing. Nares normal. Oral mucosa moist.  Hearing intact. Neck: Supple, with full range of motion. Trachea midline. Elevated JVD. Respiratory:  Mild crackles on Rt side, decreased lung sounds on left. Cardiovascular: Normal rate, regular rhythm with normal S1/S2 without murmurs. B/l lower extremity edema- improving, chronic skin changes. B/l palpable pulses. Scrotum size has been decreasing. Abdomen: Soft, non-tender, distended with normal bowel sounds. Musculoskeletal: No joint swelling or tenderness. Normal tone. No abnormal movements. Skin: Warm and dry.  No rashes or lesions. Neurologic:  No focal sensory/motor deficits in the upper or lower extremities. Cranial nerves:  grossly non-focal 2-12. Psychiatric: Alert and oriented, normal insight and thought content. Capillary Refill: Brisk,< 3 seconds. Peripheral Pulses: +2 palpable, equal bilaterally. Labs:   Recent Labs     07/26/22  0445   WBC 7.3   HGB 16.6   HCT 53.5*          Recent Labs     07/23/22 2006 07/24/22  1842 07/25/22  0350   NA  --   --  135   K  --  4.3 3.8   CL  --   --  89*   CO2  --   --  37*   BUN  --   --  8   CREATININE  --   --  0.8   CALCIUM  --   --  9.5   PHOS 4.0  --  3.7       Recent Labs     07/24/22  1030   AST 26   ALT 16   BILIDIR <0.2   BILITOT 0.8   ALKPHOS 78       Recent Labs     07/24/22  0949 07/24/22  1653   INR 0.98 0.99       No results for input(s): CKTOTAL, TROPONINI in the last 72 hours. No results for input(s): PROCAL in the last 72 hours. Lab Results   Component Value Date/Time    NITRU NEGATIVE 07/23/2022 07:20 PM    BLOODU NEGATIVE 07/23/2022 07:20 PM    GLUCOSEU NEGATIVE 07/23/2022 07:20 PM       Radiology (48 hours):  US SCROTUM AND TESTICLES    Result Date: 7/23/2022  1. Normal testicular ultrasound. 2. Bilateral hydroceles. 3. Scrotal swelling, likely secondary to cellulitis. Clinical correlation is required. 4. Normal Doppler study. Final report electronically signed by Dr. Moses Mayer on 7/23/2022 6:03 PM    XR CHEST PORTABLE    Result Date: 7/23/2022  1. Moderate cardiomegaly with pulmonary vascular congestion suspicious for congestive heart failure. 2. Prominent pulmonary interstitium suspicious for pulmonary edema. **This report has been created using voice recognition software. It may contain minor errors which are inherent in voice recognition technology. ** Final report electronically signed by Dr. Moses Mayer on 7/23/2022 3:43 PM    VL DUP LOWER EXTREMITY VENOUS BILATERAL    Result Date: 7/24/2022  1.  There is nonvisualization of the anterior tibial veins on the right. This may be related to possible age-indeterminate thrombus. However, the right peroneal and posterior tibial veins appear patent. 2. There is nonvisualization of the anterior tibial veins on the left. This may be related to possible age-indeterminate thrombus. However, the left posterior tibial veins appear patent. The left peroneal vein is not well seen. This could be related to possible partial age-indeterminate thrombus. **This report has been created using voice recognition software. It may contain minor errors which are inherent in voice recognition technology. ** Final report electronically signed by Dr. Wade Tate on 7/24/2022 9:26 AM    CTA CHEST W CONTRAST    Result Date: 7/24/2022  1. No pulmonary embolism. 2. Subsegmental atelectatic change involving the bilateral lungs greatest involving the left lung base. No consolidation. 3. Borderline enlargement of the heart. 4. Hepatomegaly and hepatic steatosis. 5. Subcutaneous edema and stranding within the left flank, image 209 of series 4. This document has been electronically signed by: Severa Pfeiffer, DO on 07/24/2022 01:47 AM All CTs at this facility use dose modulation techniques and iterative reconstructions, and/or weight-based dosing when appropriate to reduce radiation to a low as reasonably achievable. 3D Post-processing was performed on this study. DVT prophylaxis:    [x] Lovenox  [] SCDs  [] SQ Heparin  [] Encourage ambulation   [] Already on Anticoagulation       Diet: ADULT DIET; Regular; Low Sodium (2 gm); 1800 ml  Code Status: Full Code  PT/OT: ordered but not working  Tele: Reviewed- sinus rhythm   IVF: Continue diuretics. This transcription was electronically signed. Parts of this transcriptions may have been dictated by use of voice recognition software and electronically transcribed, The transcription may contain errors not detected in proofreading.       Kingsley Rodriguez MD  Internal medicine, [facility].

## 2022-07-26 NOTE — DISCHARGE INSTRUCTIONS
Started you on Bumex 1 mg. Get a BMP in a week. Follow up with PCP. You were started on losartan 25 mg and coreg 12.5. Amlodipine was discontinued. Your blood pressure is much better on these blood pressure meds.

## 2022-07-26 NOTE — PROGRESS NOTES
A home oxygen evaluation has been completed. [x]Patient is an inpatient. It is expected that the patient will be discharged within the next 48 hours. Qualified provider to write orders for possible sleep study or home oxygen prescription. Social service/care managers will arrange for home oxygen if ordered. If patient is active, arrange for Home Medical supplier to assess for Oxygen Conserving Device per pulse oximetry. []Patient is an outpatient. Results will be faxed to the ordering provider. Qualified provider to write orders for possible sleep study or home oxygen prescription. Patient was on room air. SpO2 was 93-94 % on room air at rest. Patients SpO2 was 89% or above and did not qualify for home oxygen. Patient was walked for 5-6 minutes. SpO2 was 90-91 % during walking. Patients SpO2 was 89% or above and did not qualify for home oxygen. Patient does have a positive pressure airway device at home. Patient is  diagnosed with Obstructive Sleep Apnea. Patient was not set up/instructed on a nocturnal study. Results were given to Dr. Claudia Toribio.

## 2022-07-26 NOTE — FLOWSHEET NOTE
07/26/22 1456   Safe Environment   Safety Measures Other (comment)  (Virtual Safety Round Complete)   Virtual Nurse introduced, pt denies needs at this time.

## 2022-07-26 NOTE — PLAN OF CARE
Problem: Discharge Planning  Goal: Discharge to home or other facility with appropriate resources  7/26/2022 0441 by Babita Menjivar RN  Outcome: Progressing Towards Goal  7/26/2022 0439 by Babita Menjivar RN  Outcome: Progressing Towards Goal  Flowsheets (Taken 7/25/2022 1930)  Discharge to home or other facility with appropriate resources: Identify barriers to discharge with patient and caregiver  7/25/2022 1756 by Jose Mireles RN  Outcome: Progressing Towards Goal  Flowsheets (Taken 7/23/2022 2302 by Donald Connell RN)  Discharge to home or other facility with appropriate resources:   Identify discharge learning needs (meds, wound care, etc)   Refer to discharge planning if patient needs post-hospital services based on physician order or complex needs related to functional status, cognitive ability or social support system   Identify barriers to discharge with patient and caregiver     Problem: Safety - Adult  Goal: Free from fall injury  7/26/2022 0441 by Babita Menjivar RN  Outcome: Progressing Towards Goal  Note: Bed locked & in low position, call light in reach, side-rails up x2, bed/chair alarm utilized, non-slip socks on when ambulating, reminded patient to use call light to call for assistance. 7/26/2022 0439 by Babita Menjivar RN  Outcome: Progressing Towards Goal  Flowsheets (Taken 7/25/2022 2340)  Free From Fall Injury:   Instruct family/caregiver on patient safety   Based on caregiver fall risk screen, instruct family/caregiver to ask for assistance with transferring infant if caregiver noted to have fall risk factors  Note: Bed locked & in low position, call light in reach, side-rails up x2, bed/chair alarm utilized, non-slip socks on when ambulating, reminded patient to use call light to call for assistance.    7/25/2022 1756 by Jose Mireles RN  Outcome: Progressing Towards Goal  Flowsheets (Taken 7/25/2022 0800)  Free From Fall Injury: Instruct family/caregiver on patient safety     Problem: Skin/Tissue Integrity  Goal: Absence of new skin breakdown  Description: 1. Monitor for areas of redness and/or skin breakdown  2. Assess vascular access sites hourly  3. Every 4-6 hours minimum:  Change oxygen saturation probe site  4. Every 4-6 hours:  If on nasal continuous positive airway pressure, respiratory therapy assess nares and determine need for appliance change or resting period. 7/26/2022 0441 by Alireza Delaney RN  Outcome: Progressing Towards Goal  Note: Ongoing assessment & interventions provided throughout shift. Skin assessments provided. Encouraging/assisting patient to turn as needed. 7/26/2022 0439 by Alireza Delaney RN  Outcome: Progressing Towards Goal  Note: Ongoing assessment & interventions provided throughout shift. Skin assessments provided. Encouraging/assisting patient to turn as needed. 7/25/2022 1756 by Kathy Swain RN  Outcome: Progressing Towards Goal     Problem: ABCDS Injury Assessment  Goal: Absence of physical injury  7/26/2022 0441 by Alireza Delaney RN  Outcome: Progressing Towards Goal  7/26/2022 0439 by Alireza Delaney RN  Outcome: Progressing Towards Goal  Flowsheets (Taken 7/25/2022 2340)  Absence of Physical Injury: Implement safety measures based on patient assessment  7/25/2022 1756 by Kathy Swain RN  Outcome: Progressing Towards Goal     Problem: Pain  Goal: Verbalizes/displays adequate comfort level or baseline comfort level  7/26/2022 0441 by Alireza Delaney RN  Outcome: Progressing Towards Goal  Note: Ongoing assessment & interventions provided throughout shift. Reminded patient to report any pain, pressure, or shortness of breath to the nurse. 7/26/2022 0439 by Alireza Delaney RN  Outcome: Progressing Towards Goal  Note: Ongoing assessment & interventions provided throughout shift. Reminded patient to report any pain, pressure, or shortness of breath to the nurse.    7/25/2022 1756 by Kathy Swain RN  Outcome: Progressing Towards Goal Problem: Genitourinary - Adult  Goal: Absence of urinary retention  7/26/2022 0441 by Gi Bran RN  Outcome: Progressing Towards Goal  7/26/2022 0439 by Gi Bran RN  Outcome: Progressing Towards Goal  Flowsheets (Taken 7/25/2022 1930)  Absence of urinary retention: Assess patients ability to void and empty bladder  7/25/2022 1756 by Skye Cano RN  Outcome: Progressing Towards Goal  Flowsheets (Taken 7/25/2022 1756)  Absence of urinary retention:   Assess patients ability to void and empty bladder   Monitor intake/output and perform bladder scan as needed     Problem: Metabolic/Fluid and Electrolytes - Adult  Goal: Hemodynamic stability and optimal renal function maintained  7/26/2022 0441 by Gi Bran RN  Outcome: Progressing Towards Goal  7/26/2022 0439 by Gi Bran RN  Outcome: Progressing Towards Goal  Flowsheets (Taken 7/25/2022 1930)  Hemodynamic stability and optimal renal function maintained: Monitor labs and assess for signs and symptoms of volume excess or deficit  7/25/2022 1756 by Skye Cano RN  Outcome: Progressing Towards Goal  Flowsheets (Taken 7/25/2022 1756)  Hemodynamic stability and optimal renal function maintained:   Monitor labs and assess for signs and symptoms of volume excess or deficit   Monitor intake, output and patient weight   Instruct patient on fluid and nutrition restrictions as appropriate     Problem: Chronic Conditions and Co-morbidities  Goal: Patient's chronic conditions and co-morbidity symptoms are monitored and maintained or improved  7/26/2022 0441 by Gi Bran RN  Outcome: Progressing Towards Goal  7/26/2022 0439 by Gi Bran RN  Outcome: Progressing Towards Goal  Flowsheets (Taken 7/25/2022 1930)  Care Plan - Patient's Chronic Conditions and Co-Morbidity Symptoms are Monitored and Maintained or Improved: Monitor and assess patient's chronic conditions and comorbid symptoms for stability, deterioration, or improvement  7/25/2022 1756 by Dion Clemons RN  Outcome: Progressing Towards Goal   Care plan reviewed with patient. Patient verbalizes understanding of the care plan and contributed to goal setting.

## 2022-07-28 NOTE — DISCHARGE SUMMARY
Hospital Medicine Discharge Summary      Patient Identification:   Meagan Mccracken   : 1970  MRN: [id number]   Account: [de-identified]      Patient's PCP: David Ibrahim DO    Admit Date: 2022     Discharge Date: 2022      Admitting Physician: Miller Paris MD     Discharge Physician: Jeb Galdamez MD       Hospital Course:     22-year-old gentleman with past medical history of hypertension, obstructive sleep apnea on CPAP compliant and alcohol abuse disorder presented to the hospital complaining of scrotal swelling and shortness of breath. As per patient he was feeling short of breath for last 1 month and he was supposed to get diuretics but his primary care physician did not send the prescription. Patient also complaining of leg swelling, increasing abdominal girth, and 6 days ago he started his scrotum is swollen. He denied any recent travel, any chest pain, nausea, vomiting, blurriness of vision, headache, dizziness, loss of consciousness or any fall. In ED patient blood pressure was 118 x 110, saturating 89% on room air, lab work done which showed sodium 136, potassium 4.4, creatinine 0.9, troponin less than 0.01. LFTs unremarkable. Hemoglobin 15.2 likely secondary polycythemia. EKG reviewed which showed sinus tachycardia with right bundle branch block. Chest x-ray reviewed which showed mild pulmonary congestion with moderate cardiomegaly. He he quit smoking 30 years ago now he chews nicotine, he is a daily drinker couple of beers on every day basis. Due to acute respiratory distress he underwent CTA chest which showed no pulmonary embolism, bilateral lung atelectatic changes, cardiomegaly, hepatomegaly and subcutaneous flank edema. Chest x-ray concerning for pulmonary edema, cardiomegaly concerning for pulmonary edema. He underwent scrotal ultrasound which showed normal testicular ultrasound, bilateral hydroceles.      His D-dimers were elevated and venous Doppler bilateral lower extremity concerning for age indeterminant thrombus. Repeat ultrasound showed no DVT. 66-year-old gentleman with past medical history of hypertension, obstructive sleep apnea on CPAP compliant and alcohol abuse disorder presented to the hospital complaining of scrotal swelling and shortness of breath. As per patient he has been feeling short of breath for last 1 month and he was supposed to get diuretics but his primary care physician did not send the prescription. Patient also complaining of leg swelling, increasing abdominal girth, and 6 days ago he started his scrotum is swollen. Patient uses 2-3 pillows, complaining of orthopnea, exertional dyspnea. He told me that his blood pressure was always difficult to control and he was only taking amlodipine. He denied any recent travel, any chest pain, nausea, vomiting, blurriness of vision, headache, dizziness, loss of consciousness or any fall. In ED patient blood pressure was 118 x 110, saturating 89% on room air, lab work done which showed sodium 136, potassium 4.4, creatinine 0.9, troponin less than 0.01. LFTs unremarkable. Hemoglobin 15.2 likely secondary polycythemia. EKG reviewed which showed sinus tachycardia with right bundle branch block. Chest x-ray reviewed which showed mild pulmonary congestion with moderate cardiomegaly. Patient told me that he does not move around much he was from 8 PM to 6 AM and usually he sits or stands. He lives at home alone but he has few friends which visit in some time. He denied any history of TIA, stroke, heart problems, lung problems. He he quit smoking 30 years ago now he chews nicotine, he is a daily drinker couple of beers on every day basis. Due to acute respiratory distress he underwent CTA chest which showed no pulmonary embolism, bilateral lung atelectatic changes, cardiomegaly, hepatomegaly and subcutaneous flank edema.      Chest x-ray concerning for pulmonary edema, cardiomegaly concerning for pulmonary edema. He underwent scrotal ultrasound which showed normal testicular ultrasound, bilateral hydroceles. His D-dimers were elevated and venous Doppler bilateral lower extremity concerning for age indeterminant thrombus. Weaned off of oxygen, received diuretics, scrotal swelling improved and patient was discharged home on home O2 evaluation with discharge instructions. Discharge Diagnoses:  Acute HFpEF likely secondary to diastolic dysfunction caused by uncontrolled hypertension. Improved. Start on bumex 1 mg. BMP in a week. Follow up with PCP. Lost 28 LB. Off of oxygen, did not qualify for home O2. .  Echo reviewed no diastolic dysfunction. Acute pulmonary edema. Resolved. Acute hypoxic respiratory failure secondary to cardiopulmonary decompensation. Resolved   Essential hypertension. Controled, started on losrtan 25 mg, coreg 12. 5. continue Bumex 1 mg. Obstructive sleep apnea-obesity hypoventilation syndrome BiPAP at night. Hydrocele causing scrotal swelling. Much better. Alcohol abuse disorder. Thiamine and folate. Chronic venous dermatitis. Likely secondary to longstanding untreated heart failure. Continue diuretics. Elevate legs  Morbid obesity. Weight loss  Hypotonic hyponatremia- Likely secondary to diuresis- anticipate improvement as patient is going home on low dose of diuretics. BMP in a week, follow up with PCP with results. CTA showed Small subcentimeter nodes scattered throughout the mediastinum. Results forwarded to PCP. The patient was seen and examined on day of discharge and this discharge summary is in conjunction with any daily progress note from day of discharge. The patient is discharged in stable condition. Discharge instructions given  Started you on Bumex 1 mg. Get a BMP in a week. Follow up with PCP. You were started on losartan 25 mg and coreg 12.5. Amlodipine was discontinued.    Your blood pressure is much better on these blood pressure meds. Exam:     Vitals:  Vitals:    07/26/22 0818 07/26/22 1044 07/26/22 1048 07/26/22 1818   BP: 114/68 114/68  114/68   Pulse: 84 87     Resp:  20     Temp:  98.2 °F (36.8 °C)     TempSrc:  Oral     SpO2:  (!) 85% 91%    Weight:       Height:         Weight: Weight: 292 lb 15.9 oz (132.9 kg)     24 hour intake/output:No intake or output data in the 24 hours ending 07/27/22 2100    General: No distress, appears old for the age. Eyes:  PERRL. Conjunctivae/corneas clear. HENT: Head normal appearing. Nares normal. Oral mucosa moist.  Hearing intact. Neck: Supple, with full range of motion. Trachea midline. Elevated JVD. Respiratory:  Mild crackles on Rt side, decreased lung sounds on left. Cardiovascular: Normal rate, regular rhythm with normal S1/S2 without murmurs. B/l lower extremity edema- improving, chronic skin changes. B/l palpable pulses. Scrotum size has been decreasing. Abdomen: Soft, non-tender, distended with normal bowel sounds. Musculoskeletal: No joint swelling or tenderness. Normal tone. No abnormal movements. Skin: Warm and dry. No rashes or lesions. Neurologic:  No focal sensory/motor deficits in the upper or lower extremities. Cranial nerves:  grossly non-focal 2-12. Psychiatric: Alert and oriented, normal insight and thought content. Capillary Refill: Brisk,< 3 seconds. Peripheral Pulses: +2 palpable, equal bilaterally. Labs:  For convenience and continuity at follow-up the following most recent labs are provided:    CBC:    Lab Results   Component Value Date/Time    WBC 7.3 07/26/2022 04:45 AM    HGB 16.6 07/26/2022 04:45 AM    HCT 53.5 07/26/2022 04:45 AM     07/26/2022 04:45 AM       Renal:    Lab Results   Component Value Date/Time     07/26/2022 04:44 PM    K 4.3 07/26/2022 04:44 PM    CL 83 07/26/2022 04:44 PM    CO2 33 07/26/2022 04:44 PM    BUN 21 07/26/2022 04:44 PM    CREATININE 1.1 07/26/2022 04:44 PM    CALCIUM 10.1 07/26/2022 04:44 PM    PHOS 3.7 07/25/2022 03:50 AM         Significant Diagnostic Studies    Radiology:   VL DUP LOWER EXTREMITY VENOUS BILATERAL   Final Result   Both right and left anterior tibial veins show color flow is no evidence of DVT. **This report has been created using voice recognition software. It may contain minor errors which are inherent in voice recognition technology. **      Final report electronically signed by Dr. Edie Monroe on 7/25/2022 8:49 AM      VL DUP LOWER EXTREMITY VENOUS BILATERAL   Final Result      1. There is nonvisualization of the anterior tibial veins on the right. This may be related to possible age-indeterminate thrombus. However, the right peroneal and posterior tibial veins appear patent. 2. There is nonvisualization of the anterior tibial veins on the left. This may be related to possible age-indeterminate thrombus. However, the left posterior tibial veins appear patent. The left peroneal vein is not well seen. This could be related to    possible partial age-indeterminate thrombus. **This report has been created using voice recognition software. It may contain minor errors which are inherent in voice recognition technology. **      Final report electronically signed by Dr. Yannick Carpenter on 7/24/2022 9:26 AM      CTA CHEST W CONTRAST   Final Result   1. No pulmonary embolism. 2. Subsegmental atelectatic change involving the bilateral lungs greatest    involving the left lung base. No consolidation. 3. Borderline enlargement of the heart. 4. Hepatomegaly and hepatic steatosis. 5. Subcutaneous edema and stranding within the left flank, image 209 of    series 4.       This document has been electronically signed by: Kim Buckley DO on    07/24/2022 01:47 AM      All CTs at this facility use dose modulation techniques and iterative    reconstructions, and/or weight-based dosing   when appropriate to reduce radiation to a low as reasonably achievable. 3D Post-processing was performed on this study. US SCROTUM AND TESTICLES   Final Result   1. Normal testicular ultrasound. 2. Bilateral hydroceles. 3. Scrotal swelling, likely secondary to cellulitis. Clinical correlation is required. 4. Normal Doppler study. Final report electronically signed by Dr. Misha Blunt on 7/23/2022 6:03 PM      XR CHEST PORTABLE   Final Result   1. Moderate cardiomegaly with pulmonary vascular congestion suspicious for congestive heart failure. 2. Prominent pulmonary interstitium suspicious for pulmonary edema. **This report has been created using voice recognition software. It may contain minor errors which are inherent in voice recognition technology. **      Final report electronically signed by Dr. Misha Blunt on 7/23/2022 3:43 PM             Consults:     IP CONSULT TO RESPIRATORY CARE  IP CONSULT TO UROLOGY  IP CONSULT TO ADDICTION SERVICES    Disposition: Home  Condition at Discharge: Stable    Code Status:  Prior full code    Patient Instructions:    Discharge lab work: BMP with results to PCP  Activity: activity as tolerated  Diet: No diet orders on file      Follow-up visits:   DO Cyril Jason [facility] [address] [place] [address]    Schedule an appointment as soon as possible for a visit in 1 week(s)           Discharge Medications:        Medication List        START taking these medications      bumetanide 1 MG tablet  Commonly known as: BUMEX  Take 1 tablet by mouth in the morning. carvedilol 12.5 MG tablet  Commonly known as: COREG  Take 1 tablet by mouth 2 times daily     folic acid 1 MG tablet  Commonly known as: FOLVITE  Take 1 tablet by mouth in the morning. losartan 25 MG tablet  Commonly known as: COZAAR  Take 1 tablet by mouth in the morning. thiamine 100 MG tablet  Take 1 tablet by mouth in the morning.             STOP taking these medications amLODIPine 10 MG tablet  Commonly known as: NORVASC               Where to Get Your Medications        These medications were sent to [address], [address]  [address]  [address], [address] [address]      Phone: [phone]   bumetanide 1 MG tablet  carvedilol 32.6 MG tablet  folic acid 1 MG tablet  losartan 25 MG tablet  thiamine 100 MG tablet                Time Spent on discharge is 40  minutes in the examination, evaluation, counseling and review of medications and discharge plan. Thank you Rach Koch II, DO for the opportunity to be involved in this patient's care. Signed: This transcription was electronically signed. Parts of this transcriptions may have been dictated by use of voice recognition software and electronically transcribed, The transcription may contain errors not detected in proofreading. Kartik Lockett MD  Internal medicine, Hospitalist Service   [facility].

## 2022-08-02 NOTE — TELEPHONE ENCOUNTER
Called patient's home multiple times and the line just rings busy. Made a f/u appt on 8/23/22 @ 3:00pm with Dr Sheyla Campos and mailed the appointment information to the patient 8/2/22.

## 2023-10-18 ENCOUNTER — APPOINTMENT (OUTPATIENT)
Dept: GENERAL RADIOLOGY | Age: 53
DRG: 426 | End: 2023-10-18
Payer: MEDICAID

## 2023-10-18 ENCOUNTER — APPOINTMENT (OUTPATIENT)
Dept: CT IMAGING | Age: 53
DRG: 426 | End: 2023-10-18
Payer: MEDICAID

## 2023-10-18 ENCOUNTER — HOSPITAL ENCOUNTER (INPATIENT)
Age: 53
LOS: 2 days | Discharge: HOME | DRG: 426 | End: 2023-10-20
Attending: EMERGENCY MEDICINE | Admitting: PHYSICIAN ASSISTANT
Payer: MEDICAID

## 2023-10-18 DIAGNOSIS — R53.1 GENERALIZED WEAKNESS: Primary | ICD-10-CM

## 2023-10-18 DIAGNOSIS — I50.32 CHRONIC HEART FAILURE WITH PRESERVED EJECTION FRACTION (HCC): ICD-10-CM

## 2023-10-18 DIAGNOSIS — M25.369 INSTABILITY OF KNEE JOINT, UNSPECIFIED LATERALITY: ICD-10-CM

## 2023-10-18 DIAGNOSIS — Z74.09 IMPAIRED FUNCTIONAL MOBILITY, BALANCE, GAIT, AND ENDURANCE: ICD-10-CM

## 2023-10-18 DIAGNOSIS — R29.6 RECURRENT FALLS: ICD-10-CM

## 2023-10-18 LAB
ALBUMIN SERPL BCG-MCNC: 3.6 G/DL (ref 3.5–5.1)
ALP SERPL-CCNC: 81 U/L (ref 38–126)
ALT SERPL W/O P-5'-P-CCNC: 18 U/L (ref 11–66)
ANION GAP SERPL CALC-SCNC: 15 MEQ/L (ref 8–16)
AST SERPL-CCNC: 34 U/L (ref 5–40)
BASOPHILS ABSOLUTE: 0 THOU/MM3 (ref 0–0.1)
BASOPHILS NFR BLD AUTO: 0.2 %
BILIRUB CONJ SERPL-MCNC: < 0.2 MG/DL (ref 0–0.3)
BILIRUB SERPL-MCNC: 0.4 MG/DL (ref 0.3–1.2)
BILIRUB UR QL STRIP.AUTO: NEGATIVE
BUN SERPL-MCNC: 7 MG/DL (ref 7–22)
CALCIUM SERPL-MCNC: 8.9 MG/DL (ref 8.5–10.5)
CHARACTER UR: CLEAR
CHLORIDE SERPL-SCNC: 94 MEQ/L (ref 98–111)
CO2 SERPL-SCNC: 19 MEQ/L (ref 23–33)
COLOR: YELLOW
CREAT SERPL-MCNC: 0.7 MG/DL (ref 0.4–1.2)
DEPRECATED RDW RBC AUTO: 57.1 FL (ref 35–45)
EOSINOPHIL NFR BLD AUTO: 0.3 %
EOSINOPHILS ABSOLUTE: 0 THOU/MM3 (ref 0–0.4)
ERYTHROCYTE [DISTWIDTH] IN BLOOD BY AUTOMATED COUNT: 16.3 % (ref 11.5–14.5)
GFR SERPL CREATININE-BSD FRML MDRD: > 60 ML/MIN/1.73M2
GLUCOSE SERPL-MCNC: 87 MG/DL (ref 70–108)
GLUCOSE UR QL STRIP.AUTO: NEGATIVE MG/DL
HCT VFR BLD AUTO: 37.3 % (ref 42–52)
HGB BLD-MCNC: 11.7 GM/DL (ref 14–18)
HGB UR QL STRIP.AUTO: NEGATIVE
IMM GRANULOCYTES # BLD AUTO: 0.02 THOU/MM3 (ref 0–0.07)
IMM GRANULOCYTES NFR BLD AUTO: 0.3 %
KETONES UR QL STRIP.AUTO: NEGATIVE
LYMPHOCYTES ABSOLUTE: 1.2 THOU/MM3 (ref 1–4.8)
LYMPHOCYTES NFR BLD AUTO: 18.8 %
MAGNESIUM SERPL-MCNC: 2.5 MG/DL (ref 1.6–2.4)
MCH RBC QN AUTO: 29.9 PG (ref 26–33)
MCHC RBC AUTO-ENTMCNC: 31.4 GM/DL (ref 32.2–35.5)
MCV RBC AUTO: 95.4 FL (ref 80–94)
MONOCYTES ABSOLUTE: 0.5 THOU/MM3 (ref 0.4–1.3)
MONOCYTES NFR BLD AUTO: 7.7 %
NEUTROPHILS NFR BLD AUTO: 72.7 %
NITRITE UR QL STRIP: NEGATIVE
NRBC BLD AUTO-RTO: 0 /100 WBC
NT-PROBNP SERPL IA-MCNC: 450.9 PG/ML (ref 0–124)
OSMOLALITY SERPL CALC.SUM OF ELEC: 254.4 MOSMOL/KG (ref 275–300)
PH UR STRIP.AUTO: 5.5 [PH] (ref 5–9)
PLATELET # BLD AUTO: 233 THOU/MM3 (ref 130–400)
PMV BLD AUTO: 9.9 FL (ref 9.4–12.4)
POTASSIUM SERPL-SCNC: 3.9 MEQ/L (ref 3.5–5.2)
PROT SERPL-MCNC: 7 G/DL (ref 6.1–8)
PROT UR STRIP.AUTO-MCNC: NEGATIVE MG/DL
RBC # BLD AUTO: 3.91 MILL/MM3 (ref 4.7–6.1)
SEGMENTED NEUTROPHILS ABSOLUTE COUNT: 4.7 THOU/MM3 (ref 1.8–7.7)
SODIUM SERPL-SCNC: 128 MEQ/L (ref 135–145)
SP GR UR REFRACT.AUTO: < 1.005 (ref 1–1.03)
TROPONIN, HIGH SENSITIVITY: 14 NG/L (ref 0–12)
UROBILINOGEN, URINE: 0.2 EU/DL (ref 0–1)
WBC # BLD AUTO: 6.4 THOU/MM3 (ref 4.8–10.8)
WBC #/AREA URNS HPF: NEGATIVE /[HPF]

## 2023-10-18 PROCEDURE — 71046 X-RAY EXAM CHEST 2 VIEWS: CPT

## 2023-10-18 PROCEDURE — 85025 COMPLETE CBC W/AUTO DIFF WBC: CPT

## 2023-10-18 PROCEDURE — 99285 EMERGENCY DEPT VISIT HI MDM: CPT

## 2023-10-18 PROCEDURE — 80053 COMPREHEN METABOLIC PANEL: CPT

## 2023-10-18 PROCEDURE — 83735 ASSAY OF MAGNESIUM: CPT

## 2023-10-18 PROCEDURE — 93005 ELECTROCARDIOGRAM TRACING: CPT | Performed by: EMERGENCY MEDICINE

## 2023-10-18 PROCEDURE — 84484 ASSAY OF TROPONIN QUANT: CPT

## 2023-10-18 PROCEDURE — 82248 BILIRUBIN DIRECT: CPT

## 2023-10-18 PROCEDURE — 73564 X-RAY EXAM KNEE 4 OR MORE: CPT

## 2023-10-18 PROCEDURE — 99223 1ST HOSP IP/OBS HIGH 75: CPT | Performed by: PHYSICIAN ASSISTANT

## 2023-10-18 PROCEDURE — 83880 ASSAY OF NATRIURETIC PEPTIDE: CPT

## 2023-10-18 PROCEDURE — 36415 COLL VENOUS BLD VENIPUNCTURE: CPT

## 2023-10-18 PROCEDURE — 81003 URINALYSIS AUTO W/O SCOPE: CPT

## 2023-10-18 PROCEDURE — 1200000003 HC TELEMETRY R&B

## 2023-10-18 PROCEDURE — 70450 CT HEAD/BRAIN W/O DYE: CPT

## 2023-10-18 PROCEDURE — 2580000003 HC RX 258: Performed by: EMERGENCY MEDICINE

## 2023-10-18 RX ORDER — SODIUM CHLORIDE 9 MG/ML
INJECTION, SOLUTION INTRAVENOUS CONTINUOUS
Status: DISCONTINUED | OUTPATIENT
Start: 2023-10-18 | End: 2023-10-18

## 2023-10-18 RX ADMIN — SODIUM CHLORIDE: 9 INJECTION, SOLUTION INTRAVENOUS at 20:33

## 2023-10-18 ASSESSMENT — PAIN - FUNCTIONAL ASSESSMENT
PAIN_FUNCTIONAL_ASSESSMENT: NONE - DENIES PAIN

## 2023-10-18 NOTE — ED NOTES
Pt to ED c/o bilateral leg weakness causing several falls recently. Pt states he lives at home but doesn't have a walker yet. Pt denies any pain. Pt denies chest pain and SOB. VSS. Respirations even and unlabored. EKG complete.       Gavino Carrion RN  10/18/23 1958

## 2023-10-19 ENCOUNTER — APPOINTMENT (OUTPATIENT)
Dept: MRI IMAGING | Age: 53
DRG: 426 | End: 2023-10-19
Payer: MEDICAID

## 2023-10-19 PROBLEM — I10 ESSENTIAL HYPERTENSION: Status: ACTIVE | Noted: 2023-10-19

## 2023-10-19 PROBLEM — I50.30 (HFPEF) HEART FAILURE WITH PRESERVED EJECTION FRACTION (HCC): Status: ACTIVE | Noted: 2023-10-19

## 2023-10-19 LAB
25(OH)D3 SERPL-MCNC: 10 NG/ML (ref 30–100)
ANION GAP SERPL CALC-SCNC: 13 MEQ/L (ref 8–16)
ANION GAP SERPL CALC-SCNC: 13 MEQ/L (ref 8–16)
BASOPHILS ABSOLUTE: 0 THOU/MM3 (ref 0–0.1)
BASOPHILS NFR BLD AUTO: 0.2 %
BUN SERPL-MCNC: 7 MG/DL (ref 7–22)
BUN SERPL-MCNC: 8 MG/DL (ref 7–22)
CALCIUM SERPL-MCNC: 8.8 MG/DL (ref 8.5–10.5)
CALCIUM SERPL-MCNC: 8.9 MG/DL (ref 8.5–10.5)
CHLORIDE SERPL-SCNC: 101 MEQ/L (ref 98–111)
CHLORIDE SERPL-SCNC: 99 MEQ/L (ref 98–111)
CO2 SERPL-SCNC: 20 MEQ/L (ref 23–33)
CO2 SERPL-SCNC: 21 MEQ/L (ref 23–33)
CREAT SERPL-MCNC: 0.7 MG/DL (ref 0.4–1.2)
CREAT SERPL-MCNC: 0.8 MG/DL (ref 0.4–1.2)
DEPRECATED RDW RBC AUTO: 56.5 FL (ref 35–45)
EKG ATRIAL RATE: 74 BPM
EKG P AXIS: -6 DEGREES
EKG P-R INTERVAL: 174 MS
EKG Q-T INTERVAL: 408 MS
EKG QRS DURATION: 114 MS
EKG QTC CALCULATION (BAZETT): 452 MS
EKG R AXIS: 114 DEGREES
EKG T AXIS: 2 DEGREES
EKG VENTRICULAR RATE: 74 BPM
EOSINOPHIL NFR BLD AUTO: 0.5 %
EOSINOPHILS ABSOLUTE: 0 THOU/MM3 (ref 0–0.4)
ERYTHROCYTE [DISTWIDTH] IN BLOOD BY AUTOMATED COUNT: 16.7 % (ref 11.5–14.5)
FOLATE SERPL-MCNC: 4.5 NG/ML (ref 4.8–24.2)
GFR SERPL CREATININE-BSD FRML MDRD: > 60 ML/MIN/1.73M2
GFR SERPL CREATININE-BSD FRML MDRD: > 60 ML/MIN/1.73M2
GLUCOSE SERPL-MCNC: 86 MG/DL (ref 70–108)
GLUCOSE SERPL-MCNC: 92 MG/DL (ref 70–108)
HCT VFR BLD AUTO: 37.1 % (ref 42–52)
HGB BLD-MCNC: 12 GM/DL (ref 14–18)
IMM GRANULOCYTES # BLD AUTO: 0.03 THOU/MM3 (ref 0–0.07)
IMM GRANULOCYTES NFR BLD AUTO: 0.5 %
LYMPHOCYTES ABSOLUTE: 0.7 THOU/MM3 (ref 1–4.8)
LYMPHOCYTES NFR BLD AUTO: 12.1 %
MCH RBC QN AUTO: 30.2 PG (ref 26–33)
MCHC RBC AUTO-ENTMCNC: 32.3 GM/DL (ref 32.2–35.5)
MCV RBC AUTO: 93.2 FL (ref 80–94)
MONOCYTES ABSOLUTE: 0.4 THOU/MM3 (ref 0.4–1.3)
MONOCYTES NFR BLD AUTO: 6.8 %
NEUTROPHILS NFR BLD AUTO: 79.9 %
NRBC BLD AUTO-RTO: 0 /100 WBC
OSMOLALITY SERPL CALC.SUM OF ELEC: 264.3 MOSMOL/KG (ref 275–300)
OSMOLALITY SERPL CALC.SUM OF ELEC: 265.5 MOSMOL/KG (ref 275–300)
PLATELET # BLD AUTO: 205 THOU/MM3 (ref 130–400)
PMV BLD AUTO: 9.6 FL (ref 9.4–12.4)
POTASSIUM SERPL-SCNC: 4.5 MEQ/L (ref 3.5–5.2)
POTASSIUM SERPL-SCNC: 4.7 MEQ/L (ref 3.5–5.2)
RBC # BLD AUTO: 3.98 MILL/MM3 (ref 4.7–6.1)
SEGMENTED NEUTROPHILS ABSOLUTE COUNT: 4.7 THOU/MM3 (ref 1.8–7.7)
SODIUM SERPL-SCNC: 133 MEQ/L (ref 135–145)
SODIUM SERPL-SCNC: 134 MEQ/L (ref 135–145)
TSH SERPL DL<=0.005 MIU/L-ACNC: 0.76 UIU/ML (ref 0.4–4.2)
VIT B12 SERPL-MCNC: 863 PG/ML (ref 211–911)
WBC # BLD AUTO: 5.9 THOU/MM3 (ref 4.8–10.8)

## 2023-10-19 PROCEDURE — 1200000003 HC TELEMETRY R&B

## 2023-10-19 PROCEDURE — 6360000002 HC RX W HCPCS: Performed by: PHYSICIAN ASSISTANT

## 2023-10-19 PROCEDURE — 80048 BASIC METABOLIC PNL TOTAL CA: CPT

## 2023-10-19 PROCEDURE — 85025 COMPLETE CBC W/AUTO DIFF WBC: CPT

## 2023-10-19 PROCEDURE — 82607 VITAMIN B-12: CPT

## 2023-10-19 PROCEDURE — 70553 MRI BRAIN STEM W/O & W/DYE: CPT

## 2023-10-19 PROCEDURE — 36415 COLL VENOUS BLD VENIPUNCTURE: CPT

## 2023-10-19 PROCEDURE — 82306 VITAMIN D 25 HYDROXY: CPT

## 2023-10-19 PROCEDURE — 2580000003 HC RX 258: Performed by: PHYSICIAN ASSISTANT

## 2023-10-19 PROCEDURE — 84443 ASSAY THYROID STIM HORMONE: CPT

## 2023-10-19 PROCEDURE — 82746 ASSAY OF FOLIC ACID SERUM: CPT

## 2023-10-19 PROCEDURE — 6360000004 HC RX CONTRAST MEDICATION: Performed by: NURSE PRACTITIONER

## 2023-10-19 PROCEDURE — A9579 GAD-BASE MR CONTRAST NOS,1ML: HCPCS | Performed by: NURSE PRACTITIONER

## 2023-10-19 PROCEDURE — 6370000000 HC RX 637 (ALT 250 FOR IP): Performed by: PHYSICIAN ASSISTANT

## 2023-10-19 PROCEDURE — 99233 SBSQ HOSP IP/OBS HIGH 50: CPT | Performed by: PHYSICIAN ASSISTANT

## 2023-10-19 PROCEDURE — 93010 ELECTROCARDIOGRAM REPORT: CPT | Performed by: INTERNAL MEDICINE

## 2023-10-19 PROCEDURE — 72158 MRI LUMBAR SPINE W/O & W/DYE: CPT

## 2023-10-19 PROCEDURE — 94761 N-INVAS EAR/PLS OXIMETRY MLT: CPT

## 2023-10-19 RX ORDER — LOSARTAN POTASSIUM 100 MG/1
100 TABLET ORAL DAILY
Status: ON HOLD | COMMUNITY
End: 2023-10-20 | Stop reason: HOSPADM

## 2023-10-19 RX ORDER — ONDANSETRON 2 MG/ML
4 INJECTION INTRAMUSCULAR; INTRAVENOUS EVERY 6 HOURS PRN
Status: DISCONTINUED | OUTPATIENT
Start: 2023-10-19 | End: 2023-10-20 | Stop reason: HOSPADM

## 2023-10-19 RX ORDER — ENOXAPARIN SODIUM 100 MG/ML
30 INJECTION SUBCUTANEOUS 2 TIMES DAILY
Status: DISCONTINUED | OUTPATIENT
Start: 2023-10-19 | End: 2023-10-20 | Stop reason: HOSPADM

## 2023-10-19 RX ORDER — POLYETHYLENE GLYCOL 3350 17 G/17G
17 POWDER, FOR SOLUTION ORAL DAILY PRN
Status: DISCONTINUED | OUTPATIENT
Start: 2023-10-19 | End: 2023-10-20 | Stop reason: HOSPADM

## 2023-10-19 RX ORDER — ACETAMINOPHEN 650 MG/1
650 SUPPOSITORY RECTAL EVERY 6 HOURS PRN
Status: DISCONTINUED | OUTPATIENT
Start: 2023-10-19 | End: 2023-10-20 | Stop reason: HOSPADM

## 2023-10-19 RX ORDER — SODIUM CHLORIDE 0.9 % (FLUSH) 0.9 %
5-40 SYRINGE (ML) INJECTION PRN
Status: DISCONTINUED | OUTPATIENT
Start: 2023-10-19 | End: 2023-10-20 | Stop reason: HOSPADM

## 2023-10-19 RX ORDER — POTASSIUM CHLORIDE 7.45 MG/ML
10 INJECTION INTRAVENOUS PRN
Status: DISCONTINUED | OUTPATIENT
Start: 2023-10-19 | End: 2023-10-20 | Stop reason: HOSPADM

## 2023-10-19 RX ORDER — BUMETANIDE 1 MG/1
1 TABLET ORAL DAILY
Status: DISCONTINUED | OUTPATIENT
Start: 2023-10-19 | End: 2023-10-20 | Stop reason: HOSPADM

## 2023-10-19 RX ORDER — POTASSIUM CHLORIDE 20 MEQ/1
40 TABLET, EXTENDED RELEASE ORAL PRN
Status: DISCONTINUED | OUTPATIENT
Start: 2023-10-19 | End: 2023-10-20 | Stop reason: HOSPADM

## 2023-10-19 RX ORDER — FOLIC ACID 1 MG/1
1 TABLET ORAL DAILY
Status: DISCONTINUED | OUTPATIENT
Start: 2023-10-19 | End: 2023-10-20 | Stop reason: HOSPADM

## 2023-10-19 RX ORDER — MAGNESIUM SULFATE IN WATER 40 MG/ML
2000 INJECTION, SOLUTION INTRAVENOUS PRN
Status: DISCONTINUED | OUTPATIENT
Start: 2023-10-19 | End: 2023-10-20 | Stop reason: HOSPADM

## 2023-10-19 RX ORDER — LOSARTAN POTASSIUM 25 MG/1
25 TABLET ORAL DAILY
Status: DISCONTINUED | OUTPATIENT
Start: 2023-10-19 | End: 2023-10-20 | Stop reason: HOSPADM

## 2023-10-19 RX ORDER — SODIUM CHLORIDE 0.9 % (FLUSH) 0.9 %
5-40 SYRINGE (ML) INJECTION EVERY 12 HOURS SCHEDULED
Status: DISCONTINUED | OUTPATIENT
Start: 2023-10-19 | End: 2023-10-20 | Stop reason: HOSPADM

## 2023-10-19 RX ORDER — SPIRONOLACTONE 25 MG/1
25 TABLET ORAL DAILY
COMMUNITY

## 2023-10-19 RX ORDER — SODIUM CHLORIDE 9 MG/ML
INJECTION, SOLUTION INTRAVENOUS PRN
Status: DISCONTINUED | OUTPATIENT
Start: 2023-10-19 | End: 2023-10-20 | Stop reason: HOSPADM

## 2023-10-19 RX ORDER — LANOLIN ALCOHOL/MO/W.PET/CERES
100 CREAM (GRAM) TOPICAL DAILY
Status: DISCONTINUED | OUTPATIENT
Start: 2023-10-19 | End: 2023-10-20 | Stop reason: HOSPADM

## 2023-10-19 RX ORDER — SODIUM CHLORIDE 9 MG/ML
INJECTION, SOLUTION INTRAVENOUS CONTINUOUS
Status: DISCONTINUED | OUTPATIENT
Start: 2023-10-19 | End: 2023-10-20

## 2023-10-19 RX ORDER — ONDANSETRON 4 MG/1
4 TABLET, ORALLY DISINTEGRATING ORAL EVERY 8 HOURS PRN
Status: DISCONTINUED | OUTPATIENT
Start: 2023-10-19 | End: 2023-10-20 | Stop reason: HOSPADM

## 2023-10-19 RX ORDER — ACETAMINOPHEN 325 MG/1
650 TABLET ORAL EVERY 6 HOURS PRN
Status: DISCONTINUED | OUTPATIENT
Start: 2023-10-19 | End: 2023-10-20 | Stop reason: HOSPADM

## 2023-10-19 RX ORDER — CARVEDILOL 6.25 MG/1
12.5 TABLET ORAL 2 TIMES DAILY
Status: DISCONTINUED | OUTPATIENT
Start: 2023-10-19 | End: 2023-10-20 | Stop reason: HOSPADM

## 2023-10-19 RX ADMIN — BUMETANIDE 1 MG: 1 TABLET ORAL at 13:58

## 2023-10-19 RX ADMIN — SODIUM CHLORIDE: 9 INJECTION, SOLUTION INTRAVENOUS at 06:14

## 2023-10-19 RX ADMIN — CARVEDILOL 12.5 MG: 6.25 TABLET, FILM COATED ORAL at 13:57

## 2023-10-19 RX ADMIN — SODIUM CHLORIDE: 9 INJECTION, SOLUTION INTRAVENOUS at 12:44

## 2023-10-19 RX ADMIN — LOSARTAN POTASSIUM 25 MG: 25 TABLET, FILM COATED ORAL at 13:58

## 2023-10-19 RX ADMIN — ENOXAPARIN SODIUM 30 MG: 100 INJECTION SUBCUTANEOUS at 21:24

## 2023-10-19 RX ADMIN — GADOTERIDOL 20 ML: 279.3 INJECTION, SOLUTION INTRAVENOUS at 08:33

## 2023-10-19 RX ADMIN — ENOXAPARIN SODIUM 30 MG: 100 INJECTION SUBCUTANEOUS at 13:58

## 2023-10-19 ASSESSMENT — PAIN SCALES - GENERAL: PAINLEVEL_OUTOF10: 0

## 2023-10-19 ASSESSMENT — ENCOUNTER SYMPTOMS
ALLERGIC/IMMUNOLOGIC NEGATIVE: 1
EYES NEGATIVE: 1
GASTROINTESTINAL NEGATIVE: 1
RESPIRATORY NEGATIVE: 1

## 2023-10-19 ASSESSMENT — PAIN - FUNCTIONAL ASSESSMENT
PAIN_FUNCTIONAL_ASSESSMENT: NONE - DENIES PAIN

## 2023-10-19 NOTE — PROGRESS NOTES
This VN called into patient room to complete admission documentation at primary RN's request. Neurology at bedside at this time. VN to re-attempt at later time.
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
## 2023-10-19 NOTE — ED NOTES
Patient transported to  on cart and in stable condition. Contacted floor charge before transported.       Wayne [facility] Benedict  10/19/23 7105

## 2023-10-19 NOTE — ED NOTES
Patient resting in bed with eyes closed, respirations are even and unlabored. Awakens easily to verbal stimuli. Warm blanket provided, denies any other needs. Call light in reach.       Neelima Garcia RN  10/19/23 4710

## 2023-10-19 NOTE — ED NOTES
Patient up to side of bed to use urinal and then repositioned in bed. Denies any other needs. Call light in reach.       Ирина Lazar, RN  10/19/23 7911

## 2023-10-19 NOTE — PROGRESS NOTES
Pt admitted to   in a wheelchair. Complaints: Generalized weakness. Vital signs obtained. Assessment and data collection initiated. Two nurse skin assessment performed by Mary Abrams and [facility] RN. Oriented to room. Policies and procedures for [facility] explained. Dariusz discussed hourly rounding with patient addressing 5 P's. Fall prevention and safety brochure discussed with patient. Bed alarm on. Call light in reach.

## 2023-10-19 NOTE — H&P
Hospitalist - History & Physical      Patient: Xiomara Bolivar    Unit/Bed:38/038A  YOB: 1970  MRN: [id number]   Acct: [de-identified]   PCP: Giana Nation DO      Assessment and Plan:        Bilateral lower extremity weakness:   Patient states unable to rise from standing for greater than 1 year  Crawls on the floor using his arms if there is no one to assist  Family living in his home will assist him to standing position  PT/OT consult  MRI brain, MRI lumbar spine  If negative consider lumbar puncture  HFpEF:   ECHO 2022 EF 60 to 65%  Daily weights  Monitor I&O  Continue Bumex  Essential hypertension:   Continue losartan  History of alcohol abuse:   Daily drinking is significantly reduced due to lack of income  Monitor for signs of withdrawal  Obstructive sleep apnea on CPAP:   Home unit when available  Hyponatremia  Urine osmolality on the urine collected prior to all IV fluid would be best  Every 6 hours BMPs  Would suspect beer potomania however the patient does deny drinking more than 2 beers daily      CC: Bilateral lower extremity weakness    HPI: Patient presents to the emergency department with bilateral lower extremity weakness for 1 year. Patient reports that approximately 1 year ago he had an episode at home where he stood up after a night of playing videogames and collapsed to the floor and was unable to use his legs. He was taken to his bedroom where he laid in bed for a few days and prayed. During that time he describes chest heaviness and seeing the face of his  mother and grandmother. Since then he has not been able to use his legs to rise from a seated position without assistance. If he gets up from the kitchen table he has to push off from the table. If arising from the couch he has to push off from the arm of the couch.   If rising from the toilet he will fall forward from the toilet and army crawl out into the living space and then his family will help him 26.0 - 33.0 pg    MCHC 31.4 (L) 32.2 - 35.5 gm/dl    RDW-CV 16.3 (H) 11.5 - 14.5 %    RDW-SD 57.1 (H) 35.0 - 45.0 fL    Platelets 703 760 - 290 thou/mm3    MPV 9.9 9.4 - 12.4 fL    Seg Neutrophils 72.7 %    Lymphocytes 18.8 %    Monocytes 7.7 %    Eosinophils 0.3 %    Basophils 0.2 %    Immature Granulocytes 0.3 %    Segs Absolute 4.7 1.8 - 7.7 thou/mm3    Lymphocytes Absolute 1.2 1.0 - 4.8 thou/mm3    Monocytes Absolute 0.5 0.4 - 1.3 thou/mm3    Eosinophils Absolute 0.0 0.0 - 0.4 thou/mm3    Basophils Absolute 0.0 0.0 - 0.1 thou/mm3    Immature Grans (Abs) 0.02 0.00 - 0.07 thou/mm3    nRBC 0 /100 wbc   Brain Natriuretic Peptide    Collection Time: 10/18/23  8:49 PM   Result Value Ref Range    Pro-.9 (H) 0.0 - 124.0 pg/mL   Basic Metabolic Panel    Collection Time: 10/18/23  8:49 PM   Result Value Ref Range    Sodium 128 (L) 135 - 145 meq/L    Potassium 3.9 3.5 - 5.2 meq/L    Chloride 94 (L) 98 - 111 meq/L    CO2 19 (L) 23 - 33 meq/L    Glucose 87 70 - 108 mg/dL    BUN 7 7 - 22 mg/dL    Creatinine 0.7 0.4 - 1.2 mg/dL    Calcium 8.9 8.5 - 10.5 mg/dL   Hepatic Function Panel    Collection Time: 10/18/23  8:49 PM   Result Value Ref Range    Albumin 3.6 3.5 - 5.1 g/dL    Total Bilirubin 0.4 0.3 - 1.2 mg/dL    Bilirubin, Direct <0.2 0.0 - 0.3 mg/dL    Alkaline Phosphatase 81 38 - 126 U/L    AST 34 5 - 40 U/L    ALT 18 11 - 66 U/L    Total Protein 7.0 6.1 - 8.0 g/dL   Troponin    Collection Time: 10/18/23  8:49 PM   Result Value Ref Range    Troponin, High Sensitivity 14 (H) 0 - 12 ng/L   Magnesium    Collection Time: 10/18/23  8:49 PM   Result Value Ref Range    Magnesium 2.5 (H) 1.6 - 2.4 mg/dL   Anion Gap    Collection Time: 10/18/23  8:49 PM   Result Value Ref Range    Anion Gap 15.0 8.0 - 16.0 meq/L   Osmolality    Collection Time: 10/18/23  8:49 PM   Result Value Ref Range    Osmolality Calc 254.4 (L) 275.0 - 300.0 mOsmol/kg   Glomerular Filtration Rate, Estimated    Collection Time: 10/18/23  8:49 PM

## 2023-10-19 NOTE — PROGRESS NOTES
Pharmacist Review and Automatic Dose Adjustment of Prophylactic Enoxaparin    Reviewed reason(s) for admission/hospital problem list    The reviewing pharmacist has made an adjustment to the ordered enoxaparin dose or converted to UFH per the approved [address] [facility] [address] protocol and table as identified below. Dat Li is a 48 y.o. male. Recent Labs     10/18/23  2049   CREATININE 0.7       Estimated Creatinine Clearance: 152 mL/min (based on SCr of 0.7 mg/dL). Recent Labs     10/18/23  2049   HGB 11.7*   HCT 37.3*        No results for input(s): \"INR\" in the last 72 hours.     Height:   Ht Readings from Last 1 Encounters:   10/19/23 1.727 m (5' 8\")     Weight:  Wt Readings from Last 1 Encounters:   10/19/23 117.9 kg (260 lb)               Plan: Based upon the patient's weight and renal function    Ordered: Enoxaparin 40mg daily    Changed/converted to    New Order: Enoxaparin 30mg SUBQ BID      Thank you,  Harrison Levy, [facility]  10/19/2023, 4:33 AM

## 2023-10-19 NOTE — ED NOTES
Pt and vs reassessed. RR easy and unlabored. Pt resting in bed with eyes closed and appears to be sleeping. No distress noted.  Pt stable at this time     Jennie De Anda RN  10/18/23 8599

## 2023-10-19 NOTE — ED NOTES
ED to inpatient nurses report    Chief Complaint   Patient presents with    Extremity Weakness     Bilateral legs       Present to ED from home  LOC: alert and orientated to name, place, date  Vital signs   Vitals:    10/18/23 2108 10/18/23 2133 10/18/23 2321 10/18/23 2341   BP: (!) 131/96 115/80 90/64 112/83   Pulse: 97 87 85 80   Resp: 30 18 17 24   Temp:       TempSrc:       SpO2:  100%  97%      Oxygen Baseline room air    Current needs required none   LDAs:   Peripheral IV 10/18/23 Left Antecubital (Active)   Site Assessment Clean, dry & intact 10/18/23 2322   Phlebitis Assessment No symptoms 10/18/23 2322   Infiltration Assessment 0 10/18/23 2322     Mobility: Requires assistance * 1 - used BSC with assistance   Pending ED orders: none  Present condition: stable    Preferred Language: Jaxson     Electronically signed by Jennie De Anda RN on 10/18/2023 at 11:52 PM       Chelita Mittal  10/18/23 [id number] [facility]

## 2023-10-19 NOTE — ED NOTES
Pt and vs reassessed. RR easy and unlabored. Pt resting in bed alert. Pt up at bedside to use urinal with assistance. No other needs at this time.  Pt stable at this time     Harding Kussmaul, 100 [address]  10/19/23 7817

## 2023-10-19 NOTE — PROGRESS NOTES
Hospitalist Progress Note    Patient:  Wilbert Hart    YOB: 1970  Unit/Bed:6K-21/021-A  Date of Admission: 10/18/2023      Assessment/Plan:    Chronic generalized weakness: Ongoing for over a year, apparently worse over the last 2 weeks. - MRI brain with no acute findings.  - MRI lumbar spine showed facet degenerative changes at L4-L5 with associated bone marrow edema. Moderate to severe stenosis of the thecal sac with mild right foraminal stenosis. Normal-appearing nerve roots of cauda equina. Patient does not have back pain.   -Neurology was consulted on admission. -PT/OT    Hyponatremia: Mild, Na 128 on arrival. Improved appropriately with NS, will decrease rate. Change Na checks to Q12h. Chronic HFpEF: Echo 2022 EF 60-65%. No signs of decompensation. Resume home BB, ARB. Hold Bumex for now. Strict I/Os. Essential HTN: cont home meds. History of alcohol abuse: states his daily drinking is significantly reduced due to lack of income. Monitor for signs of withdrawal. Thiamine, folic acid, MTV.     REKHA: home CPAP      Chief Complaint: Bilateral lower extremity weakness    HPI / Hospital Course: Initial HPI: \"Patient presents to the emergency department with bilateral lower extremity weakness for 1 year. Patient reports that approximately 1 year ago he had an episode at home where he stood up after a night of playing videogames and collapsed to the floor and was unable to use his legs. He was taken to his bedroom where he laid in bed for a few days and prayed. During that time he describes chest heaviness and seeing the face of his  mother and grandmother. Since then he has not been able to use his legs to rise from a seated position without assistance. If he gets up from the kitchen table he has to push off from the table. If arising from the couch he has to push off from the arm of the couch.   If rising from the toilet he will fall forward from the toilet There is no spinal canal stenosis. There is no foraminal stenosis. There is no abnormal enhancement within the spinal canal. There are no suspicious findings in the visualized aspects of the retroperitoneum and paraspinal soft tissues. 1. Facet degenerative changes at the L4-5 level. There is an associated bone marrow edema. This can be a source of pain. There is moderate to severe stenosis of the thecal sac with mild right foraminal stenosis at this level. 2. Normal-appearing nerve roots of the cauda equina. **This report has been created using voice recognition software. It may contain minor errors which are inherent in voice recognition technology. ** Final report electronically signed by Dr. Per Rudolph on 10/19/2023 8:54 AM    MRI BRAIN W WO CONTRAST    Result Date: 10/19/2023  PROCEDURE: MRI BRAIN W WO CONTRAST CLINICAL INFORMATIONBilateral lower extremity weakness. COMPARISON: Head CT 10/18/2023. TECHNIQUE: Multiplanar and multiple spin echo T1 and T2-weighted images were obtained through the brain before and after the administration of intravenous contrast. FINDINGS: The diffusion-weighted images are normal. The brain volume is moderately reduced. .There are no intra-or extra-axial collections. There is no hydrocephalus, midline shift or mass effect. On the FLAIR and T2-weighted sequences, there is normal signal intensity in the brain. On the gradient echo T2-weighted images, there is mineralization in the medial aspects of the basal ganglia. There is no abnormal enhancement in the brain. The major intracranial vascular flow voids are present. The midline craniocervical junction structures are normal.  The brainstem and pituitary gland are normal. There is severe mucosal thickening in the left maxillary sinus. The mucosa is enhancing. There is mild mucosal thickening in the right maxillary sinus. There is some mucosal thickening in the left ethmoid air cells. 1. No acute findings in the brain.

## 2023-10-19 NOTE — ED NOTES
Pt and vs reassessed. RR easy and unlabored. Pt resting in bed with eyes closed and appears to be sleeping.  Pt stable at this time     Brynn Mann, 100 [address]  10/19/23 0102

## 2023-10-19 NOTE — ED NOTES
Pt resting in bed at this time with unlabored respirs.  VSS, denies needs     Kunal Alfredo  10/19/23 0236

## 2023-10-19 NOTE — ED NOTES
IV fluids started on patient as ordered. Updated on plan of care. Denies any needs. Call light in reach.       Scout Horn, RN  10/19/23 3727

## 2023-10-19 NOTE — ED NOTES
Pt helped to side of bed to use urinal; pt unsteady on his feet trying to unbutton his pants. Pt unable to use his feet to boost himself up in bed. This RN and Ward Heath RN helped patient back into bed.       Brenda Huddleston RN  10/18/23 8186

## 2023-10-19 NOTE — ED PROVIDER NOTES
1. Recurrent falls    2. Instability of knee joint, unspecified laterality    3. Impaired functional mobility, balance, gait, and endurance          DISPOSITION/PLAN   DISPOSITION Decision To Admit 10/18/2023 11:02:24 PM      PATIENT REFERRED TO: Patient is referred to the hospitalist services, [address] [place] [address] graciously admitted the patient        (Please note that portions of this note were completed with a voice recognition program and electronically transcribed. Efforts were made to edit the dictations but occasionally words are mis-transcribed . The transcription may contain errors not detected in proofreading.   This transcription was electronically signed.)     10/18/23 11:26 PM      Christine Meek MD      Emergency room physician             Christine Meek MD  10/18/23 6231

## 2023-10-19 NOTE — ED NOTES
Pt resting in bed with eyes closed and responds to voice. RR easy and unlabored. No distress noted.  Pt stable at this time     Dottiedioni Evans, 100 [address]  10/18/23 8582

## 2023-10-19 NOTE — PLAN OF CARE
Problem: Discharge Planning  Goal: Discharge to home or other facility with appropriate resources  Outcome: Progressing  Flowsheets (Taken 10/19/2023 1112)  Discharge to home or other facility with appropriate resources: Identify barriers to discharge with patient and caregiver  Note: Patient from home with family, plans to return once medically stable      Problem: Safety - Adult  Goal: Free from fall injury  Outcome: Progressing  Flowsheets (Taken 10/19/2023 1631)  Free From Fall Injury:   Instruct family/caregiver on patient safety   Based on caregiver fall risk screen, instruct family/caregiver to ask for assistance with transferring infant if caregiver noted to have fall risk factors     Problem: Pain  Goal: Verbalizes/displays adequate comfort level or baseline comfort level  Outcome: Progressing  Flowsheets (Taken 10/19/2023 1631)  Verbalizes/displays adequate comfort level or baseline comfort level:   Encourage patient to monitor pain and request assistance   Assess pain using appropriate pain scale   Administer analgesics based on type and severity of pain and evaluate response   Implement non-pharmacological measures as appropriate and evaluate response     Problem: Skin/Tissue Integrity  Goal: Absence of new skin breakdown  Description: 1. Monitor for areas of redness and/or skin breakdown  2. Assess vascular access sites hourly  3. Every 4-6 hours minimum:  Change oxygen saturation probe site  4. Every 4-6 hours:  If on nasal continuous positive airway pressure, respiratory therapy assess nares and determine need for appliance change or resting period.   Outcome: Progressing     Problem: Neurosensory - Adult  Goal: Achieves stable or improved neurological status  Outcome: Progressing  Flowsheets (Taken 10/19/2023 1631)  Achieves stable or improved neurological status:   Assess for and report changes in neurological status   Initiate measures to prevent increased intracranial pressure   Maintain Assessment  Goal: Absence of physical injury  Outcome: Progressing  Flowsheets (Taken 10/19/2023 1631)  Absence of Physical Injury: Implement safety measures based on patient assessment   Care plan reviewed with patient. Patient verbalizes understanding of the plan of care and contribute to goal setting.

## 2023-10-20 VITALS
SYSTOLIC BLOOD PRESSURE: 138 MMHG | BODY MASS INDEX: 31.61 KG/M2 | RESPIRATION RATE: 18 BRPM | TEMPERATURE: 97.8 F | HEIGHT: 68 IN | WEIGHT: 208.56 LBS | HEART RATE: 79 BPM | OXYGEN SATURATION: 99 % | DIASTOLIC BLOOD PRESSURE: 103 MMHG

## 2023-10-20 LAB
ANION GAP SERPL CALC-SCNC: 12 MEQ/L (ref 8–16)
BUN SERPL-MCNC: 11 MG/DL (ref 7–22)
CALCIUM SERPL-MCNC: 8.6 MG/DL (ref 8.5–10.5)
CHLORIDE SERPL-SCNC: 98 MEQ/L (ref 98–111)
CO2 SERPL-SCNC: 24 MEQ/L (ref 23–33)
CREAT SERPL-MCNC: 0.8 MG/DL (ref 0.4–1.2)
GFR SERPL CREATININE-BSD FRML MDRD: > 60 ML/MIN/1.73M2
GLUCOSE SERPL-MCNC: 95 MG/DL (ref 70–108)
POTASSIUM SERPL-SCNC: 4.2 MEQ/L (ref 3.5–5.2)
SODIUM SERPL-SCNC: 134 MEQ/L (ref 135–145)

## 2023-10-20 PROCEDURE — 97162 PT EVAL MOD COMPLEX 30 MIN: CPT

## 2023-10-20 PROCEDURE — 36415 COLL VENOUS BLD VENIPUNCTURE: CPT

## 2023-10-20 PROCEDURE — 2580000003 HC RX 258: Performed by: PHYSICIAN ASSISTANT

## 2023-10-20 PROCEDURE — 80048 BASIC METABOLIC PNL TOTAL CA: CPT

## 2023-10-20 PROCEDURE — 6370000000 HC RX 637 (ALT 250 FOR IP): Performed by: PHYSICIAN ASSISTANT

## 2023-10-20 PROCEDURE — 84425 ASSAY OF VITAMIN B-1: CPT

## 2023-10-20 PROCEDURE — 97116 GAIT TRAINING THERAPY: CPT

## 2023-10-20 PROCEDURE — 6360000002 HC RX W HCPCS: Performed by: PHYSICIAN ASSISTANT

## 2023-10-20 PROCEDURE — 99239 HOSP IP/OBS DSCHRG MGMT >30: CPT | Performed by: PHYSICIAN ASSISTANT

## 2023-10-20 RX ORDER — BUMETANIDE 1 MG/1
1 TABLET ORAL DAILY PRN
Qty: 60 TABLET | Refills: 0 | Status: SHIPPED
Start: 2023-10-20

## 2023-10-20 RX ORDER — ERGOCALCIFEROL 1.25 MG/1
50000 CAPSULE ORAL WEEKLY
Status: DISCONTINUED | OUTPATIENT
Start: 2023-10-20 | End: 2023-10-20 | Stop reason: HOSPADM

## 2023-10-20 RX ORDER — FOLIC ACID 1 MG/1
1 TABLET ORAL DAILY
Qty: 90 TABLET | Refills: 0 | Status: SHIPPED | OUTPATIENT
Start: 2023-10-20

## 2023-10-20 RX ORDER — ERGOCALCIFEROL 1.25 MG/1
50000 CAPSULE ORAL WEEKLY
Qty: 5 CAPSULE | Refills: 0 | Status: SHIPPED | OUTPATIENT
Start: 2023-10-27

## 2023-10-20 RX ADMIN — LOSARTAN POTASSIUM 25 MG: 25 TABLET, FILM COATED ORAL at 08:50

## 2023-10-20 RX ADMIN — SODIUM CHLORIDE: 9 INJECTION, SOLUTION INTRAVENOUS at 12:11

## 2023-10-20 RX ADMIN — CARVEDILOL 12.5 MG: 6.25 TABLET, FILM COATED ORAL at 08:50

## 2023-10-20 RX ADMIN — ENOXAPARIN SODIUM 30 MG: 100 INJECTION SUBCUTANEOUS at 08:50

## 2023-10-20 RX ADMIN — ERGOCALCIFEROL 50000 UNITS: 1.25 CAPSULE ORAL at 09:22

## 2023-10-20 RX ADMIN — Medication 100 MG: at 08:50

## 2023-10-20 RX ADMIN — FOLIC ACID 1 MG: 1 TABLET ORAL at 08:50

## 2023-10-20 NOTE — PROGRESS NOTES
[facility]  INPATIENT PHYSICAL THERAPY  EVALUATION  STRZ RENAL TELEMETRY 6K - 6K-21/021-A    Time In: 7136  Time Out: 1407  Timed Code Treatment Minutes: 12 Minutes  Minutes: 20          Date: 10/20/2023  Patient Name: Xiomara Lutz,  Gender:  male        MRN: [id number]  : 1970  (48 y.o.)      Referring Practitioner: Lyndsey Harris PA-C  Diagnosis: generalized weakness  Additional Pertinent Hx: Per EMR \"Patient presents to the emergency department with bilateral lower extremity weakness for 1 year. Patient reports that approximately 1 year ago he had an episode at home where he stood up after a night of playing videogames and collapsed to the floor and was unable to use his legs. He was taken to his bedroom where he laid in bed for a few days and prayed. During that time he describes chest heaviness and seeing the face of his  mother and grandmother. Since then he has not been able to use his legs to rise from a seated position without assistance. If he gets up from the kitchen table he has to push off from the table. If arising from the couch he has to push off from the arm of the couch. If rising from the toilet he will fall forward from the toilet and army crawl out into the living space and then his family will help him up from the floor. If there is no family at home he will Army crawl through the house to prevent falling. The patient states he has falls in the home because he \"walks like a toddler -all straight legged\". The patient reports he has bruises and scrapes all over his arms and knees from crawling around for the last 1 year. The patient attributes this change to his diagnosis of acute heart failure. The patient follows with a cardiologist in [place], [place]. He did not follow-up with the cardiologist when the symptoms developed.   Patient states he had been compliant with his medication until about 1 month ago when he stopped taking his medicines because he

## 2023-10-20 NOTE — CARE COORDINATION
10/20/23, 2:01 PM EDT    DISCHARGE PLANNING EVALUATION    SW consult received, no needs per CM Steven. See CM note for details.

## 2023-10-20 NOTE — PLAN OF CARE
Problem: Discharge Planning  Goal: Discharge to home or other facility with appropriate resources  10/19/2023 2250 by Lisbet Johnson RN  Outcome: Progressing  Flowsheets (Taken 10/19/2023 2250)  Discharge to home or other facility with appropriate resources:   Identify barriers to discharge with patient and caregiver   Arrange for needed discharge resources and transportation as appropriate   Identify discharge learning needs (meds, wound care, etc)     Problem: Skin/Tissue Integrity  Goal: Absence of new skin breakdown  Description: 1. Monitor for areas of redness and/or skin breakdown  2. Assess vascular access sites hourly  3. Every 4-6 hours minimum:  Change oxygen saturation probe site  4. Every 4-6 hours:  If on nasal continuous positive airway pressure, respiratory therapy assess nares and determine need for appliance change or resting period. 10/19/2023 2250 by Lisbet Johnson RN  Outcome: Progressing     Problem: Safety - Adult  Goal: Free from fall injury  10/19/2023 2250 by Lisbet Johnson RN  Outcome: Progressing  Flowsheets (Taken 10/19/2023 2250)  Free From Fall Injury:   Randall Sarmiento family/caregiver on patient safety   Based on caregiver fall risk screen, instruct family/caregiver to ask for assistance with transferring infant if caregiver noted to have fall risk factors     Problem: Pain  Goal: Verbalizes/displays adequate comfort level or baseline comfort level  10/19/2023 2250 by Lisbet Johnson RN  Outcome: Progressing  Flowsheets (Taken 10/19/2023 2250)  Verbalizes/displays adequate comfort level or baseline comfort level:   Encourage patient to monitor pain and request assistance   Assess pain using appropriate pain scale   Administer analgesics based on type and severity of pain and evaluate response   Implement non-pharmacological measures as appropriate and evaluate response     Problem: Skin/Tissue Integrity  Goal: Absence of new skin breakdown  Description: 1.   Monitor for areas of redness and/or skin breakdown  2. Assess vascular access sites hourly  3. Every 4-6 hours minimum:  Change oxygen saturation probe site  4. Every 4-6 hours:  If on nasal continuous positive airway pressure, respiratory therapy assess nares and determine need for appliance change or resting period.   10/19/2023 2250 by Von Das RN  Outcome: Progressing     Problem: Neurosensory - Adult  Goal: Achieves stable or improved neurological status  10/19/2023 2250 by Von Das RN  Outcome: Progressing  Flowsheets (Taken 10/19/2023 2250)  Achieves stable or improved neurological status:   Assess for and report changes in neurological status   Initiate measures to prevent increased intracranial pressure   Maintain blood pressure and fluid volume within ordered parameters to optimize cerebral perfusion and minimize risk of hemorrhage     Problem: Musculoskeletal - Adult  Goal: Return mobility to safest level of function  10/19/2023 2250 by Von Das RN  Outcome: Progressing  Flowsheets (Taken 10/19/2023 2250)  Return Mobility to Safest Level of Function:   Assess patient stability and activity tolerance for standing, transferring and ambulating with or without assistive devices   Assist with transfers and ambulation using safe patient handling equipment as needed     Problem: Infection - Adult  Goal: Absence of infection during hospitalization  10/19/2023 2250 by Von Das RN  Outcome: Progressing  Flowsheets (Taken 10/19/2023 2250)  Absence of infection during hospitalization:   Assess and monitor for signs and symptoms of infection   Monitor lab/diagnostic results     Problem: Chronic Conditions and Co-morbidities  Goal: Patient's chronic conditions and co-morbidity symptoms are monitored and maintained or improved  10/19/2023 2250 by Von Das RN  Outcome: Progressing  Flowsheets (Taken 10/19/2023 2250)  Care Plan - Patient's Chronic Conditions and Co-Morbidity Symptoms are Monitored and

## 2023-10-20 NOTE — PROGRESS NOTES
Comprehensive Nutrition Assessment    Type and Reason for Visit:  Initial, Positive Nutrition Screen (poor appetite/intake, unplanned weight loss)    Nutrition Recommendations/Plan:   Recommend MVI, continue thiamin and folic acid. ONS: 4 oz. Ensure Compact TID. Continue current diet. Malnutrition Assessment:  Malnutrition Status: At risk for malnutrition (Comment) (10/20/23 1046)    Context:  Chronic Illness     Findings of the 6 clinical characteristics of malnutrition:  Energy Intake:  75% or less estimated energy requirements for 1 month or longer  Weight Loss:  Unable to assess (due to hx CHF, edema fluctuations)     Body Fat Loss:  No significant body fat loss     Muscle Mass Loss:  No significant muscle mass loss    Fluid Accumulation:  No significant fluid accumulation (hx CHF)     Strength:  Not Performed    Nutrition Assessment:     Pt. nutritionally compromised AEB poor appetite/intake prior to admit. At risk for further nutrition compromise r/t admit with generalized edema, alcohol abuse, and underlying medical condition (hx: HTN, CHF, alcohol abuse, marijuana use). Nutrition Related Findings:    Pt. Report/Treatments/Miscellaneous: Patient seen, reports at the end of July 2022 he lost almost #, largely in part d/t fluid. States however he typically eats only 1 meal a day and then some times snacks. Ate ~ 75% of breakfast.  Agreeable to ONS. GI Status: no BM  Pertinent Labs: 10/20/23: Sodium 134, Glucose 95  Pertinent Meds: bumex held, folvite, thiamin, vitamin D, 0.9 NaCl at 40 ml/hr     Wound Type: None       Current Nutrition Intake & Therapies:    Average Meal Intake: 51-75% (breakfast reported)     ADULT DIET; Regular; 4 carb choices (60 gm/meal);  Low Fat/Low Chol/High Fiber/SANJANA  ADULT ORAL NUTRITION SUPPLEMENT; Breakfast, Lunch, Dinner; Standard 4 oz Oral Supplement    Anthropometric Measures:  Height: 172.7 cm (5' 8\")  Ideal Body Weight (IBW): 154 lbs (70 kg) Admission Body Weight: 104.9 kg (231 lb 4.2 oz) (10/19/23 bedscale, no edema)  Current Body Weight: 94.6 kg (208 lb 8.9 oz) (10/20/23 no weight sources, ? accuracy),      Current BMI (kg/m2): 31.7  Usual Body Weight:  (~ per patient 328.8# 7/2022 (fluid/CHF)  dropped to 235#. Per EMR: 7/25/22: 304#7oz, 8/11/23 cardiology office: 237#3. 2oz)                       BMI Categories: Obese Class 1 (BMI 30.0-34. 9)    Estimated Daily Nutrient Needs:  Energy Requirements Based On: Kcal/kg (95 kg)     Energy (kcal/day): ~ 2640-3037 kcals (15-18 kcals/kg/day)  Weight Used for Protein Requirements: Ideal (70 kg)  Protein (g/day): ~  grams(1.2-2 grams/kg/day)         Nutrition Diagnosis:   Inadequate oral intake related to inadequate protein-energy intake as evidenced by poor intake prior to admission    Nutrition Interventions:   Food and/or Nutrient Delivery: Continue Current Diet  Nutrition Education/Counseling: Education initiated (Encouraged oral intake, 3 meals per day, and to include good protein sources. Reinforced low sodium diet/fluid restriction for hx CHF.)  Coordination of Nutrition Care: Continue to monitor while inpatient       Goals:     Goals: PO intake 75% or greater, by next RD assessment       Nutrition Monitoring and Evaluation:      Food/Nutrient Intake Outcomes: Food and Nutrient Intake, Supplement Intake  Physical Signs/Symptoms Outcomes: Biochemical Data, Chewing or Swallowing, GI Status, Fluid Status or Edema, Nutrition Focused Physical Findings, Weight    Discharge Planning:     Too soon to determine     Zaida Membreno RD, LD  Contact: [phone]

## 2023-10-20 NOTE — CARE COORDINATION
Assistance needed at discharge: Transportation, Home Care, Intermediate Care            Potential DME:    Patient expects to discharge to: [address] [facility] for transportation at discharge:      Financial    Payor: [address] / Plan: [address] / Product Type: *No Product type* /     Does insurance require precert for SNF: Yes    Potential assistance Purchasing Medications:    Meds-to-Beds request: Yes      [id number] [place] [id number] - [place] - [address] [facility] [phone] [facility] [phone] [address] [facility] [address]  Phone: [phone] Fax: [phone]      Notes:    Factors facilitating achievement of predicted outcomes: Family support and Cooperative    Barriers to discharge: Lower extremity weakness and Medical complications    Additional Case Management Notes: Na 134, trops mildly elev. IVF. PT/OT. Neurology consulted and has signed off. Procedure: 10/19 MRI lumbar spine w/wo with facet degenerative changes at the L4-5 level with associated bone marrow edema. There is moderate to severe stenosis of the thecal sac with mild right foraminal stenosis at this level. The Plan for Transition of Care is related to the following treatment goals of Generalized weakness [R53.1]  Recurrent falls [R29.6]  Instability of knee joint, unspecified laterality [M25.369]  Impaired functional mobility, balance, gait, and endurance [Z74.09]    Patient Goals/Plan/Treatment Preferences: Met with Chang Leo, he plans to return  home with his cousin and father at discharge. He has a cane, but states his aunt is getting him a walker. He has plans to start working out on an exercise bike as he feels many of his issues are related to his CHF. [facility] informs that she will be ordering OP therapy as she discussed with him. Chang Leo requests CM send therapy referrals to [facility] (faxed and requested they contact patient on Monday).    Transportation/Food Security/Housekeeping Addressed: No issues identified.      Adelia Rodriguez, RN  Case Management Department

## 2023-10-20 NOTE — PROGRESS NOTES
This virtual nurse called into patient room to review discharge instructions with patient at bedside nurse request. This virtual nurse received verbal consent to turn on camera and review discharges instructions. Pt denied having any questions or concerns in regards to discharge instructions. VN observed RN handing pt belongings. Family to transport patient home as per patient report. Primary RN aware.

## 2023-10-20 NOTE — PROGRESS NOTES
Discharge teaching and instructions for diagnosis/procedure of generalized weakness completed with patient using teachback method. AVS reviewed. Printed prescriptions given to patient. Patient voiced understanding regarding prescriptions, follow up appointments, and care of self at home.  Discharged in a wheelchair to  home with support per [address] [place] [address]

## 2023-10-20 NOTE — DISCHARGE SUMMARY
Hospitalist Discharge Summary    Patient: Roma Bush  YOB: 1970  MRN: [id number]   Acct: [de-identified]    Primary Care Physician: Jah Goel DO    Admit date  10/18/2023    Discharge date:  10/20/2023    Discharge Assessment and Plan:    Chronic generalized weakness: Ongoing for over a year, apparently worse over the last 2 weeks. - MRI brain with no acute findings.  - MRI lumbar spine showed facet degenerative changes at L4-L5 with associated bone marrow edema. Moderate to severe stenosis of the thecal sac with mild right foraminal stenosis. Normal-appearing nerve roots of cauda equina. Patient does not have back pain. - Patient has no muscle weakness on exam.   - Neurology was consulted on admission, no further neurology workup needed. Signed off. -PT/OT consulted. - Pt states that he still drives. Outpatient therapy orders placed. Follow up with PCP. Hyponatremia: Improved at appropriate rate with NS and holding Bumex. Repeat BMP in 1 week and follow up with PCP. Chronic HFpEF: Echo 7/2022 EF 60-65%. No signs of decompensation. Resume home BB, ARB. Will change Bumex to daily PRN given patient was not taking it at home and he had no signs of decompensation. Follow up with PCP, Cardiology. Essential HTN: resume home meds. History of alcohol abuse: states his daily drinking is significantly reduced due to lack of income. Monitor for signs of withdrawal. Thiamine, folic acid, MTV.      REKHA: home CPAP     Folic acid deficiency: add folic acid daily. Vitamin D deficiency: Add Ergocalciferol weekly. Chief Complaint on presentation: Bilateral lower extremity weakness    Initial H&P / Hospital Course: 48year old male with PMHx HFpEF, alcohol abuse, HTN, who presented to [facility] ED for generalized weakness, ongoing since at least July of 2022 per patient.  He requires assistance ambulating at baseline, states it no one or nothing is around to hold onto, he left maxillary sinus disease. This document has been electronically signed by: Shira Fisher MD on 10/18/2023 08:41 PM All CTs at this facility use dose modulation techniques and iterative reconstructions, and/or weight-based dosing when appropriate to reduce radiation to a low as reasonably achievable. Consults:   IP CONSULT TO SOCIAL WORK  IP CONSULT TO NEUROLOGY    Discharge Medications:      Medication List        START taking these medications      Vitamin D (Ergocalciferol) 44236 units Caps  Take 50,000 Units by mouth once a week  Start taking on: October 27, 2023            CHANGE how you take these medications      bumetanide 1 MG tablet  Commonly known as: BUMEX  Take 1 tablet by mouth daily as needed (leg swelling, shortness of breath, increase in daily weight)  What changed:   when to take this  reasons to take this     losartan 25 MG tablet  Commonly known as: COZAAR  Take 1 tablet by mouth in the morning. What changed: Another medication with the same name was removed. Continue taking this medication, and follow the directions you see here. CONTINUE taking these medications      carvedilol 12.5 MG tablet  Commonly known as: COREG  Take 1 tablet by mouth 2 times daily     folic acid 1 MG tablet  Commonly known as: FOLVITE  Take 1 tablet by mouth daily     spironolactone 25 MG tablet  Commonly known as: ALDACTONE     thiamine 100 MG tablet  Take 1 tablet by mouth in the morning.             STOP taking these medications      amLODIPine 10 MG tablet  Commonly known as: NORVASC               Where to Get Your Medications        These medications were sent to [address], [address] [place] [address],[address] [place] [address] [place] Alejandrina [phone]  [address] [place] [id number]      Phone: [phone]   folic acid 1 MG tablet  Vitamin D (Ergocalciferol) 18026 units Caps       Information about where to get these medications is not yet available    Ask your nurse or doctor

## 2023-10-20 NOTE — PLAN OF CARE
Problem: Discharge Planning  Goal: Discharge to home or other facility with appropriate resources  Outcome: Adequate for Discharge  Flowsheets (Taken 10/20/2023 0830 by Oscar Garcias RN)  Discharge to home or other facility with appropriate resources: Identify barriers to discharge with patient and caregiver     Problem: Safety - Adult  Goal: Free from fall injury  Outcome: Adequate for Discharge     Problem: Pain  Goal: Verbalizes/displays adequate comfort level or baseline comfort level  Outcome: Adequate for Discharge     Problem: Skin/Tissue Integrity  Goal: Absence of new skin breakdown  Description: 1. Monitor for areas of redness and/or skin breakdown  2. Assess vascular access sites hourly  3. Every 4-6 hours minimum:  Change oxygen saturation probe site  4. Every 4-6 hours:  If on nasal continuous positive airway pressure, respiratory therapy assess nares and determine need for appliance change or resting period.   Outcome: Adequate for Discharge     Problem: Neurosensory - Adult  Goal: Achieves stable or improved neurological status  Outcome: Adequate for Discharge  Flowsheets (Taken 10/20/2023 0830 by Oscar Garcias RN)  Achieves stable or improved neurological status: Assess for and report changes in neurological status     Problem: Musculoskeletal - Adult  Goal: Return mobility to safest level of function  Outcome: Adequate for Discharge  Flowsheets (Taken 10/20/2023 0830 by Oscar Garcias RN)  Return Mobility to Safest Level of Function: Assess patient stability and activity tolerance for standing, transferring and ambulating with or without assistive devices     Problem: Infection - Adult  Goal: Absence of infection during hospitalization  Outcome: Adequate for Discharge  Flowsheets (Taken 10/20/2023 0830 by Oscar Garcias RN)  Absence of infection during hospitalization: Assess and monitor for signs and symptoms of infection     Problem: Chronic Conditions and Co-morbidities  Goal:

## 2023-10-24 NOTE — PROGRESS NOTES
Physician Progress Note      PATIENT:               Marvin Salomon  CSN #:                  [id number]  :                       1970  ADMIT DATE:       10/18/2023 7:32 PM  [address] [place] [address] DATE:        10/20/2023 3:05 PM  RESPONDING  PROVIDER #:        Anne Spear PA-C          QUERY TEXT:    Pt admitted with Chronic generalized weakness that worsened over the past 2   weeks . Pt noted to have MRI lumbar spine showed facet degenerative changes at   L4-L5 with associated bone marrow edema. Moderate to severe stenosis of the   thecal sac with mild right foraminal stenosis . If possible, please make   selection below, and or addend your discharge summary as to the relationship,   if any, between chronic and progressive weakness and severe stenosis of the   thecal sac at L4 and L5 . The medical record reflects the following:  Risk Factors: , Alcohol abuse ,repeated falls with Facet degenerative changes   at the L4-5 level. There is an associated bone marrow edema. Clinical Indicators: increased weakness on chronic weakness with MRI showing   Facet degenerative changes at the L4-5 level. There is an associated bone   marrow edema. Treatment: admission, imaging, labs, Neurology consult, consideration for   ECF/Rehab, PT/OT. Thank You ,  Clinical Documentation Dept . Options provided:  -- Increased weakness due to severe stenosis of the thecal sac at L4 and L5 ,  -- increased weakness unrelated to severe stenosis of the thecal sac at L4 and   L5  -- Other - I will add my own diagnosis  -- Disagree - Not applicable / Not valid  -- Disagree - Clinically unable to determine / Unknown  -- Refer to Clinical Documentation Reviewer    PROVIDER RESPONSE TEXT:    This patient has increased weakness due to severe stenosis of the thecal sac   at L4 and L5.     Query created by: Evone Sever on 10/23/2023 12:01 PM      Electronically signed by:  Anne Spear PA-C 10/24/2023 5:41 PM

## 2023-10-26 LAB — VIT B1 PYROPHOSHATE BLD-SCNC: 46 NMOL/L (ref 70–180)
